# Patient Record
Sex: MALE | Race: WHITE | NOT HISPANIC OR LATINO | Employment: FULL TIME | URBAN - METROPOLITAN AREA
[De-identification: names, ages, dates, MRNs, and addresses within clinical notes are randomized per-mention and may not be internally consistent; named-entity substitution may affect disease eponyms.]

---

## 2017-03-26 ENCOUNTER — TRANSCRIBE ORDERS (OUTPATIENT)
Dept: ADMINISTRATIVE | Age: 32
End: 2017-03-26

## 2017-03-26 ENCOUNTER — APPOINTMENT (OUTPATIENT)
Dept: LAB | Age: 32
End: 2017-03-26
Payer: COMMERCIAL

## 2017-03-26 DIAGNOSIS — I10 ESSENTIAL (PRIMARY) HYPERTENSION: ICD-10-CM

## 2017-03-26 DIAGNOSIS — F42.9 OBSESSIVE-COMPULSIVE DISORDER: ICD-10-CM

## 2017-03-26 DIAGNOSIS — E66.9 OBESITY: ICD-10-CM

## 2017-03-26 LAB
ANION GAP SERPL CALCULATED.3IONS-SCNC: 7 MMOL/L (ref 4–13)
BUN SERPL-MCNC: 16 MG/DL (ref 5–25)
CALCIUM SERPL-MCNC: 8.9 MG/DL (ref 8.3–10.1)
CHLORIDE SERPL-SCNC: 104 MMOL/L (ref 100–108)
CO2 SERPL-SCNC: 28 MMOL/L (ref 21–32)
CREAT SERPL-MCNC: 0.76 MG/DL (ref 0.6–1.3)
GFR SERPL CREATININE-BSD FRML MDRD: >60 ML/MIN/1.73SQ M
GLUCOSE P FAST SERPL-MCNC: 95 MG/DL (ref 65–99)
POTASSIUM SERPL-SCNC: 3.8 MMOL/L (ref 3.5–5.3)
SODIUM SERPL-SCNC: 139 MMOL/L (ref 136–145)
TSH SERPL DL<=0.05 MIU/L-ACNC: 2.9 UIU/ML (ref 0.36–3.74)

## 2017-03-26 PROCEDURE — 84443 ASSAY THYROID STIM HORMONE: CPT

## 2017-03-26 PROCEDURE — 36415 COLL VENOUS BLD VENIPUNCTURE: CPT

## 2017-03-26 PROCEDURE — 80048 BASIC METABOLIC PNL TOTAL CA: CPT

## 2017-03-27 ENCOUNTER — ALLSCRIPTS OFFICE VISIT (OUTPATIENT)
Dept: OTHER | Facility: OTHER | Age: 32
End: 2017-03-27

## 2017-09-05 DIAGNOSIS — I10 ESSENTIAL (PRIMARY) HYPERTENSION: ICD-10-CM

## 2017-09-05 DIAGNOSIS — E66.01 MORBID (SEVERE) OBESITY DUE TO EXCESS CALORIES (HCC): ICD-10-CM

## 2017-09-05 DIAGNOSIS — F41.9 ANXIETY DISORDER: ICD-10-CM

## 2017-09-05 DIAGNOSIS — F42.9 OBSESSIVE-COMPULSIVE DISORDER: ICD-10-CM

## 2017-10-08 ENCOUNTER — APPOINTMENT (OUTPATIENT)
Dept: LAB | Age: 32
End: 2017-10-08
Payer: COMMERCIAL

## 2017-10-08 ENCOUNTER — TRANSCRIBE ORDERS (OUTPATIENT)
Dept: ADMINISTRATIVE | Age: 32
End: 2017-10-08

## 2017-10-08 DIAGNOSIS — I10 ESSENTIAL (PRIMARY) HYPERTENSION: ICD-10-CM

## 2017-10-08 DIAGNOSIS — F41.9 ANXIETY DISORDER: ICD-10-CM

## 2017-10-08 DIAGNOSIS — F42.9 OBSESSIVE-COMPULSIVE DISORDER: ICD-10-CM

## 2017-10-08 DIAGNOSIS — E66.01 MORBID (SEVERE) OBESITY DUE TO EXCESS CALORIES (HCC): ICD-10-CM

## 2017-10-08 LAB
ALBUMIN SERPL BCP-MCNC: 3.7 G/DL (ref 3.5–5)
ALP SERPL-CCNC: 83 U/L (ref 46–116)
ALT SERPL W P-5'-P-CCNC: 49 U/L (ref 12–78)
ANION GAP SERPL CALCULATED.3IONS-SCNC: 6 MMOL/L (ref 4–13)
AST SERPL W P-5'-P-CCNC: 27 U/L (ref 5–45)
BILIRUB SERPL-MCNC: 0.69 MG/DL (ref 0.2–1)
BUN SERPL-MCNC: 17 MG/DL (ref 5–25)
CALCIUM SERPL-MCNC: 9 MG/DL (ref 8.3–10.1)
CHLORIDE SERPL-SCNC: 103 MMOL/L (ref 100–108)
CHOLEST SERPL-MCNC: 153 MG/DL (ref 50–200)
CO2 SERPL-SCNC: 30 MMOL/L (ref 21–32)
CREAT SERPL-MCNC: 0.73 MG/DL (ref 0.6–1.3)
ERYTHROCYTE [DISTWIDTH] IN BLOOD BY AUTOMATED COUNT: 13.4 % (ref 11.6–15.1)
GFR SERPL CREATININE-BSD FRML MDRD: 124 ML/MIN/1.73SQ M
GLUCOSE P FAST SERPL-MCNC: 91 MG/DL (ref 65–99)
HCT VFR BLD AUTO: 44.4 % (ref 36.5–49.3)
HDLC SERPL-MCNC: 50 MG/DL (ref 40–60)
HGB BLD-MCNC: 14.8 G/DL (ref 12–17)
LDLC SERPL CALC-MCNC: 87 MG/DL (ref 0–100)
MCH RBC QN AUTO: 29.4 PG (ref 26.8–34.3)
MCHC RBC AUTO-ENTMCNC: 33.3 G/DL (ref 31.4–37.4)
MCV RBC AUTO: 88 FL (ref 82–98)
PLATELET # BLD AUTO: 293 THOUSANDS/UL (ref 149–390)
PMV BLD AUTO: 11.6 FL (ref 8.9–12.7)
POTASSIUM SERPL-SCNC: 4.3 MMOL/L (ref 3.5–5.3)
PROT SERPL-MCNC: 7.9 G/DL (ref 6.4–8.2)
RBC # BLD AUTO: 5.04 MILLION/UL (ref 3.88–5.62)
SODIUM SERPL-SCNC: 139 MMOL/L (ref 136–145)
TRIGL SERPL-MCNC: 82 MG/DL
WBC # BLD AUTO: 10.07 THOUSAND/UL (ref 4.31–10.16)

## 2017-10-08 PROCEDURE — 80061 LIPID PANEL: CPT

## 2017-10-08 PROCEDURE — 80053 COMPREHEN METABOLIC PANEL: CPT

## 2017-10-08 PROCEDURE — 85027 COMPLETE CBC AUTOMATED: CPT

## 2017-10-08 PROCEDURE — 36415 COLL VENOUS BLD VENIPUNCTURE: CPT

## 2017-10-09 ENCOUNTER — ALLSCRIPTS OFFICE VISIT (OUTPATIENT)
Dept: OTHER | Facility: OTHER | Age: 32
End: 2017-10-09

## 2017-10-10 NOTE — PROGRESS NOTES
Assessment  1  Benign essential hypertension (401 1) (I10)   2  Obsessive compulsive disorder (300 3) (F42 9)   3  Anxiety (300 00) (F41 9)    Plan  Anxiety, Episodic mood disorder    · FLUoxetine HCl - 40 MG Oral Capsule; TAKE 1 CAPSULE DAILY  Benign essential hypertension    · Lisinopril 10 MG Oral Tablet; Take 1 tablet daily   · Begin or continue regular aerobic exercise  Gradually work up to at least {count1} sessions of  {dur1} of exercise a week ; Status:Complete;   Done: [id number]    Discussion/Summary  Discussion Summary:   Pt to avoid salt in diet, limit caffiene to 1-2cups / dayreviewed with pt during visit      Chief Complaint  Chief Complaint Free Text Note Form: Patient is here for a f/u for Hypertension, Anxiety, and obesity  Review blood work  Pt does not have any new complains at the moment  History of Present Illness  HPI: f/u for htn - tolerates lisinopril well, creatinine and electrolytes stablenot check at home has not been monitoring his salt intakedrinks 1-2 cups coffee / day is not exercising on regular basis      Review of Systems  Complete-Male:   Constitutional: no fever,-not feeling poorly,-no chills-and-not feeling tired  Eyes: no eye pain-and-no eyesight problems  ENT: no earache,-no nosebleeds,-no sore throat,-no nasal discharge-and-no hoarseness  Cardiovascular: no chest pain-and-no palpitations  Respiratory: no shortness of breath,-no cough,-no wheezing-and-no shortness of breath during exertion  Gastrointestinal: no abdominal pain,-no nausea-and-no constipation  Genitourinary: no dysuria  Musculoskeletal: no arthralgias,-no joint swelling-and-no joint stiffness  Integumentary: no rashes,-no itching-and-no skin lesions  Neurological: no headache-and-no dizziness  Psychiatric: no anxiety-and-no sleep disturbances  Endocrine: no hot flashes  Hematologic/Lymphatic: no swollen glands  ROS Reviewed:   ROS reviewed  Active Problems  1   Anxiety (300 00) (F41 9)   2  Benign essential hypertension (401 1) (I10)   3  Episodic mood disorder (296 90) (F39)   4  Morbid obesity with BMI of 45 0-49 9, adult (278 01,V85 42) (E66 01,Z68 42)   5  Obesity, unspecified obesity severity, unspecified obesity type (278 00) (E66 9)   6  Obsessive compulsive disorder (300 3) (F42 9)    Past Medical History  1  History of Abnormal glucose (790 29) (R73 09)   2  History of Eustachian tube disorder (381 9) (H69 90)   3  History of Plantar fascial fibromatosis (728 71) (M72 2)  Active Problems And Past Medical History Reviewed: The active problems and past medical history were reviewed and updated today  Surgical History  1  History of Appendectomy   2  History of Bone Replacement Graft  Surgical History Reviewed: The surgical history was reviewed and updated today  Family History  Mother    1  No pertinent family history  Father    2  No pertinent family history  Family History Reviewed: The family history was reviewed and updated today  Social History   · Never a smoker   · No illicit drug use   · Social alcohol use (Z78 9)  Social History Reviewed: The social history was reviewed and updated today  Current Meds   1  FLUoxetine HCl - 40 MG Oral Capsule; TAKE 1 CAPSULE DAILY  Requested for: 89MIG1577; Last   Rx:29Jun2017; Status: ACTIVE - Renewal Denied Ordered   2  Lisinopril 10 MG Oral Tablet; Take 1 tablet daily  Requested for: 97CHK9846; Last Rx:29Jun2017;   Status: ACTIVE - Renewal Denied Ordered  Medication List Reviewed: The medication list was reviewed and updated today  Allergies  1  No Known Drug Allergies  2  No Known Environmental Allergies   3   No Known Food Allergies    Vitals  Vital Signs    Recorded: 24CRA0007 08:55AM   Temperature 98 4 F, Oral   Heart Rate 85   Systolic 028, LUE, Sitting   Diastolic 78, LUE, Sitting   BP CUFF SIZE Large   Height 6 ft 1 in   Weight 354 lb 6 oz   BMI Calculated 46 75   BSA Calculated 2 74   O2 Saturation 98     Physical Exam    Constitutional   General appearance: No acute distress, well appearing and well nourished  Eyes   Conjunctiva and lids: No swelling, erythema, or discharge  Pupils and irises: Equal, round and reactive to light  Ears, Nose, Mouth, and Throat   External inspection of ears and nose: Normal     Otoscopic examination: Tympanic membrance translucent with normal light reflex  Canals patent without erythema  Nasal mucosa, septum, and turbinates: Normal without edema or erythema  Oropharynx: Normal with no erythema, edema, exudate or lesions  Pulmonary   Auscultation of lungs: Clear to auscultation, equal breath sounds bilaterally, no wheezes, no rales, no rhonci  Cardiovascular   Auscultation of heart: Normal rate and rhythm, normal S1 and S2, without murmurs  Examination of extremities for edema and/or varicosities: Normal     Abdomen   Abdomen: Non-tender, no masses  Lymphatic   Palpation of lymph nodes in neck: No lymphadenopathy  Musculoskeletal   Gait and station: Normal     Inspection/palpation of joints, bones, and muscles: Normal     Skin   Skin and subcutaneous tissue: Normal without rashes or lesions  Neurologic   Cranial nerves: Cranial nerves 2-12 intact      Psychiatric   Orientation to person, place and time: Normal     Mood and affect: Normal          Results/Data  (1) CBC/ PLT (NO DIFF) [id number] 08:24AM [facility] Order Number: [id number]     Test Name Result Flag Reference   HEMATOCRIT 44 4 %  36 5-49 3   HEMOGLOBIN 14 8 g/dL  12 0-17 0   MCHC 33 3 g/dL  31 4-37 4   MCH 29 4 pg  26 8-34 3   MCV 88 fL  82-98   PLATELET COUNT 146 Thousands/uL  149-390   RBC COUNT 5 04 Million/uL  3 88-5 62   RDW 13 4 %  11 6-15 1   WBC COUNT 10 07 Thousand/uL  4 31-10 16   MPV 11 6 fL  8 9-12 7     (1) COMPREHENSIVE METABOLIC PANEL [id number] 27:59JX [facility] Order Number: [id number]     Test Name Result Flag Reference   SODIUM 139 mmol/L  136-145   POTASSIUM 4 3 mmol/L  3 5-5 3   CHLORIDE 103 mmol/L  100-108   CARBON DIOXIDE 30 mmol/L  21-32   ANION GAP (CALC) 6 mmol/L  4-13   BLOOD UREA NITROGEN 17 mg/dL  5-25   CREATININE 0 73 mg/dL  0 60-1 30   Standardized to [facility] reference method   CALCIUM 9 0 mg/dL  8 3-10 1   BILI, TOTAL 0 69 mg/dL  0 20-1 00   ALK PHOSPHATAS 83 U/L     ALT (SGPT) 49 U/L  12-78   Specimen collection should occur prior to Sulfasalazine and/or Sulfapyridine administration due to the potential for falsely depressed results  AST(SGOT) 27 U/L  5-45   Specimen collection should occur prior to Sulfasalazine administration due to the potential for falsely depressed results  ALBUMIN 3 7 g/dL  3 5-5 0   TOTAL PROTEIN 7 9 g/dL  6 4-8 2   eGFR 124 ml/min/1 73sq m     [facility] Disease Education Program recommendations are as follows:  GFR calculation is accurate only with a steady state creatinine  Chronic Kidney disease less than 60 ml/min/1 73 sq  meters  Kidney failure less than 15 ml/min/1 73 sq  meters  GLUCOSE FASTING 91 mg/dL  65-99   Specimen collection should occur prior to Sulfasalazine administration due to the potential for falsely depressed results  Specimen collection should occur prior to Sulfapyridine administration due to the potential for falsely elevated results       (1) CBC/ PLT (NO DIFF) 08Oct2017 08:24AM [facility] Order Number: [id number]     Test Name Result Flag Reference   HEMATOCRIT 44 4 %  36 5-49 3   HEMOGLOBIN 14 8 g/dL  12 0-17 0   MCHC 33 3 g/dL  31 4-37 4   MCH 29 4 pg  26 8-34 3   MCV 88 fL  82-98   PLATELET COUNT 938 Thousands/uL  149-390   RBC COUNT 5 04 Million/uL  3 88-5 62   RDW 13 4 %  11 6-15 1   WBC COUNT 10 07 Thousand/uL  4 31-10 16   MPV 11 6 fL  8 9-12 7     (1) COMPREHENSIVE METABOLIC PANEL [id number] 60:25LM [facility] Order Number: [id number]     Test Name Result Flag Reference   SODIUM 139 mmol/L 136-145   POTASSIUM 4 3 mmol/L  3 5-5 3   CHLORIDE 103 mmol/L  100-108   CARBON DIOXIDE 30 mmol/L  21-32   ANION GAP (CALC) 6 mmol/L  4-13   BLOOD UREA NITROGEN 17 mg/dL  5-25   CREATININE 0 73 mg/dL  0 60-1 30   Standardized to IDMS reference method   CALCIUM 9 0 mg/dL  8 3-10 1   BILI, TOTAL 0 69 mg/dL  0 20-1 00   ALK PHOSPHATAS 83 U/L     ALT (SGPT) 49 U/L  12-78   Specimen collection should occur prior to Sulfasalazine and/or Sulfapyridine administration due to the potential for falsely depressed results  AST(SGOT) 27 U/L  5-45   Specimen collection should occur prior to Sulfasalazine administration due to the potential for falsely depressed results  ALBUMIN 3 7 g/dL  3 5-5 0   TOTAL PROTEIN 7 9 g/dL  6 4-8 2   eGFR 124 ml/min/1 73sq [facility] Disease Education Program recommendations are as follows:  GFR calculation is accurate only with a steady state creatinine  Chronic Kidney disease less than 60 ml/min/1 73 sq  meters  Kidney failure less than 15 ml/min/1 73 sq  meters  GLUCOSE FASTING 91 mg/dL  65-99   Specimen collection should occur prior to Sulfasalazine administration due to the potential for falsely depressed results  Specimen collection should occur prior to Sulfapyridine administration due to the potential for falsely elevated results  (1) LIPID PANEL, FASTING [id number] 08:24AM [facility] Number: [id number]     Test Name Result Flag Reference   CHOLESTEROL 153 mg/dL     HDL,DIRECT 50 mg/dL  40-60   Specimen collection should occur prior to Metamizole administration due to the potential for falsley depressed results  LDL CHOLESTEROL CALCULATED 87 mg/dL  0-100   - Patient Instructions:  This is a fasting blood test  Water,black tea or black  coffee only after 9:00pm the night before test   Drink 2 glasses of water the morning of test       Triglyceride:        Normal <150 mg/dl   Borderline High 150-199 mg/dl   High 200-499 mg/dl   Very High >499 mg/dl      Cholesterol:       Desirable <200 mg/dl    Borderline High 200-239 mg/dl    High >239 mg/dl      HDL Cholesterol:       High>59 mg/dL    Low <41 mg/dL      This screening LDL is a calculated result  It does not have the accuracy of the Direct Measured LDL in the monitoring of patients with hyperlipidemia and/or statin therapy  Direct Measure LDL (OLV892) must be ordered separately in these patients  TRIGLYCERIDES 82 mg/dL  <=150   Specimen collection should occur prior to N-Acetylcysteine or Metamizole administration due to the potential for falsely depressed results  Verified Results  (1) LIPID PANEL, FASTING [id number] 08:24AM Cristin [facility]into Order Number: [id number]     Test Name Result Flag Reference   CHOLESTEROL 153 mg/dL     HDL,DIRECT 50 mg/dL  40-60   Specimen collection should occur prior to Metamizole administration due to the potential for falsley depressed results  LDL CHOLESTEROL CALCULATED 87 mg/dL  0-100   - Patient Instructions: This is a fasting blood test  Water,black tea or black  coffee only after 9:00pm the night before test   Drink 2 glasses of water the morning of test       Triglyceride:        Normal <150 mg/dl   Borderline High 150-199 mg/dl   High 200-499 mg/dl   Very High >499 mg/dl      Cholesterol:       Desirable <200 mg/dl    Borderline High 200-239 mg/dl    High >239 mg/dl      HDL Cholesterol:       High>59 mg/dL    Low <41 mg/dL      This screening LDL is a calculated result  It does not have the accuracy of the Direct Measured LDL in the monitoring of patients with hyperlipidemia and/or statin therapy  Direct Measure LDL (TIT752) must be ordered separately in these patients  TRIGLYCERIDES 82 mg/dL  <=150   Specimen collection should occur prior to N-Acetylcysteine or Metamizole administration due to the potential for falsely depressed results         Signatures   Electronically signed by : Melissa Delacruz, [facility]; Oct  9 2017  9:18AM EST                       (Author)    Electronically signed by : Isaias Pallas, MD; Oct  9 2017  5:13PM EST

## 2018-01-13 VITALS
DIASTOLIC BLOOD PRESSURE: 86 MMHG | HEIGHT: 73 IN | TEMPERATURE: 98.5 F | SYSTOLIC BLOOD PRESSURE: 130 MMHG | WEIGHT: 315 LBS | BODY MASS INDEX: 41.75 KG/M2 | HEART RATE: 116 BPM | OXYGEN SATURATION: 98 %

## 2018-01-13 VITALS
DIASTOLIC BLOOD PRESSURE: 78 MMHG | WEIGHT: 315 LBS | HEIGHT: 73 IN | TEMPERATURE: 98.4 F | BODY MASS INDEX: 41.75 KG/M2 | HEART RATE: 85 BPM | SYSTOLIC BLOOD PRESSURE: 124 MMHG | OXYGEN SATURATION: 98 %

## 2018-01-15 NOTE — RESULT NOTES
Verified Results  (1) CBC/ PLT (NO DIFF) 08Oct2017 08:24AM [facility] Order Number: [id number]     Test Name Result Flag Reference   HEMATOCRIT 44 4 %  36 5-49 3   HEMOGLOBIN 14 8 g/dL  12 0-17 0   MCHC 33 3 g/dL  31 4-37 4   MCH 29 4 pg  26 8-34 3   MCV 88 fL  82-98   PLATELET COUNT 976 Thousands/uL  149-390   RBC COUNT 5 04 Million/uL  3 88-5 62   RDW 13 4 %  11 6-15 1   WBC COUNT 10 07 Thousand/uL  4 31-10 16   MPV 11 6 fL  8 9-12 7     (1) COMPREHENSIVE METABOLIC PANEL [id number] 28:40MX [facility] Order Number: [id number]     Test Name Result Flag Reference   SODIUM 139 mmol/L  136-145   POTASSIUM 4 3 mmol/L  3 5-5 3   CHLORIDE 103 mmol/L  100-108   CARBON DIOXIDE 30 mmol/L  21-32   ANION GAP (CALC) 6 mmol/L  4-13   BLOOD UREA NITROGEN 17 mg/dL  5-25   CREATININE 0 73 mg/dL  0 60-1 30   Standardized to IDMS reference method   CALCIUM 9 0 mg/dL  8 3-10 1   BILI, TOTAL 0 69 mg/dL  0 20-1 00   ALK PHOSPHATAS 83 U/L     ALT (SGPT) 49 U/L  12-78   Specimen collection should occur prior to Sulfasalazine and/or Sulfapyridine administration due to the potential for falsely depressed results  AST(SGOT) 27 U/L  5-45   Specimen collection should occur prior to Sulfasalazine administration due to the potential for falsely depressed results  ALBUMIN 3 7 g/dL  3 5-5 0   TOTAL PROTEIN 7 9 g/dL  6 4-8 2   eGFR 124 ml/min/1 73sq m     [facility] Disease Education Program recommendations are as follows:  GFR calculation is accurate only with a steady state creatinine  Chronic Kidney disease less than 60 ml/min/1 73 sq  meters  Kidney failure less than 15 ml/min/1 73 sq  meters  GLUCOSE FASTING 91 mg/dL  65-99   Specimen collection should occur prior to Sulfasalazine administration due to the potential for falsely depressed results  Specimen collection should occur prior to Sulfapyridine administration due to the potential for falsely elevated results  Plan  Anxiety, Episodic mood disorder    · FLUoxetine HCl - 40 MG Oral Capsule; TAKE 1 CAPSULE DAILY  Benign essential hypertension    · Lisinopril 10 MG Oral Tablet;  Take 1 tablet daily

## 2018-04-16 DIAGNOSIS — F41.9 ANXIETY: ICD-10-CM

## 2018-04-16 DIAGNOSIS — I10 ESSENTIAL HYPERTENSION, BENIGN: Primary | ICD-10-CM

## 2018-04-16 RX ORDER — LISINOPRIL 10 MG/1
1 TABLET ORAL DAILY
COMMUNITY
End: 2018-04-16 | Stop reason: SDUPTHER

## 2018-04-16 RX ORDER — FLUOXETINE HYDROCHLORIDE 40 MG/1
1 CAPSULE ORAL DAILY
COMMUNITY
End: 2018-04-16 | Stop reason: SDUPTHER

## 2018-04-16 RX ORDER — FLUOXETINE HYDROCHLORIDE 40 MG/1
40 CAPSULE ORAL DAILY
Qty: 90 CAPSULE | Refills: 0 | Status: SHIPPED | OUTPATIENT
Start: 2018-04-16 | End: 2018-07-12 | Stop reason: SDUPTHER

## 2018-04-16 RX ORDER — LISINOPRIL 10 MG/1
10 TABLET ORAL DAILY
Qty: 90 TABLET | Refills: 0 | Status: SHIPPED | OUTPATIENT
Start: 2018-04-16 | End: 2018-07-12 | Stop reason: SDUPTHER

## 2018-07-12 DIAGNOSIS — I10 ESSENTIAL HYPERTENSION, BENIGN: ICD-10-CM

## 2018-07-12 DIAGNOSIS — F41.9 ANXIETY: ICD-10-CM

## 2018-07-12 RX ORDER — FLUOXETINE HYDROCHLORIDE 40 MG/1
40 CAPSULE ORAL DAILY
Qty: 90 CAPSULE | Refills: 0 | Status: SHIPPED | OUTPATIENT
Start: 2018-07-12 | End: 2018-10-22 | Stop reason: SDUPTHER

## 2018-07-12 RX ORDER — LISINOPRIL 10 MG/1
10 TABLET ORAL DAILY
Qty: 90 TABLET | Refills: 0 | Status: SHIPPED | OUTPATIENT
Start: 2018-07-12 | End: 2018-10-22 | Stop reason: SDUPTHER

## 2018-07-12 NOTE — TELEPHONE ENCOUNTER
Last appt, 10/9/17    Next appt, none pending     Was told he needed appt for further refills  Pt did not schedule f/u  Will hav him called for appt

## 2018-07-13 NOTE — TELEPHONE ENCOUNTER
Meds filled for 90 days with no refills  Please call pt and have him schedule f/u appt for additional refills  Thank you!

## 2018-07-13 NOTE — TELEPHONE ENCOUNTER
lmom for this patient to call the office to make an appt within the next 90 days for medication refills

## 2018-07-14 DIAGNOSIS — F41.9 ANXIETY: ICD-10-CM

## 2018-07-14 DIAGNOSIS — I10 ESSENTIAL HYPERTENSION, BENIGN: ICD-10-CM

## 2018-07-14 RX ORDER — FLUOXETINE HYDROCHLORIDE 40 MG/1
40 CAPSULE ORAL DAILY
Qty: 90 CAPSULE | Refills: 0 | OUTPATIENT
Start: 2018-07-14

## 2018-07-14 RX ORDER — LISINOPRIL 10 MG/1
10 TABLET ORAL DAILY
Qty: 90 TABLET | Refills: 0 | OUTPATIENT
Start: 2018-07-14

## 2018-10-22 ENCOUNTER — OFFICE VISIT (OUTPATIENT)
Dept: INTERNAL MEDICINE CLINIC | Facility: CLINIC | Age: 33
End: 2018-10-22

## 2018-10-22 VITALS
HEIGHT: 75 IN | OXYGEN SATURATION: 98 % | SYSTOLIC BLOOD PRESSURE: 118 MMHG | DIASTOLIC BLOOD PRESSURE: 80 MMHG | TEMPERATURE: 97.3 F | HEART RATE: 73 BPM | WEIGHT: 315 LBS | BODY MASS INDEX: 39.17 KG/M2

## 2018-10-22 DIAGNOSIS — F42.9 OBSESSIVE-COMPULSIVE DISORDER, UNSPECIFIED TYPE: ICD-10-CM

## 2018-10-22 DIAGNOSIS — F41.9 ANXIETY: ICD-10-CM

## 2018-10-22 DIAGNOSIS — I10 BENIGN ESSENTIAL HTN: Primary | ICD-10-CM

## 2018-10-22 DIAGNOSIS — I10 ESSENTIAL HYPERTENSION, BENIGN: ICD-10-CM

## 2018-10-22 DIAGNOSIS — G47.30 OBSERVED SLEEP APNEA: ICD-10-CM

## 2018-10-22 DIAGNOSIS — E66.01 MORBID OBESITY WITH BMI OF 45.0-49.9, ADULT (HCC): ICD-10-CM

## 2018-10-22 PROCEDURE — 99213 OFFICE O/P EST LOW 20 MIN: CPT | Performed by: NURSE PRACTITIONER

## 2018-10-22 RX ORDER — LISINOPRIL 10 MG/1
10 TABLET ORAL DAILY
Qty: 90 TABLET | Refills: 0 | Status: SHIPPED | OUTPATIENT
Start: 2018-10-22 | End: 2019-01-21 | Stop reason: SDUPTHER

## 2018-10-22 RX ORDER — FLUOXETINE HYDROCHLORIDE 40 MG/1
40 CAPSULE ORAL DAILY
Qty: 90 CAPSULE | Refills: 0 | Status: SHIPPED | OUTPATIENT
Start: 2018-10-22 | End: 2019-01-21 | Stop reason: SDUPTHER

## 2018-10-22 NOTE — PATIENT INSTRUCTIONS
Follow up in 3 months   Start healthy diet and start an exercise routine with 30 minutes of moderate intensity exercise 5x a week  Look into getting insurance

## 2018-10-22 NOTE — PROGRESS NOTES
Assessment/Plan:     Diagnoses and all orders for this visit:    Anxiety  -     FLUoxetine (PROzac) 40 MG capsule; Take 1 capsule (40 mg total) by mouth daily        -     well controlled continue Prozac 40 mg daily    Essential hypertension, benign  -     lisinopril (ZESTRIL) 10 mg tablet; Take 1 tablet (10 mg total) by mouth daily        -      well controlled  Continue lisinopril 10 mg daily  Morbid obesity with BMI of 45 0-49 9, adult ([facility] [address] )         -     discussed at length with patient the need for improvement of his weight  I discussed with him following up with weight management and trying conservative therapies with diet and exercise or meeting with bariatric surgery to discuss weight loss surgery  He is not interested at this time as he does not have health insurance  I discussed with him that there is a significant amount of improvement that he can make on his own within his own lifestyle  I advised him to start an exercise routine where he is doing at least 30 minutes of moderately intense exercise 5 times a week  I also reviewed healthy diet with him that would be higher in fruits and vegetables, lean protein, low-fat dairy  He needs to cut out fast food and cut back on carbohydrates as well as fried greasy and fatty foods  I will have him follow up with our office in about 3 months to follow his weight  I strongly recommended to him that he look for healthcare insurance so that he would be able to have surgery covered  I explained to to him that with weight loss he may be able to come off of his blood pressure medication and this would also help with his possible sleep apnea  I explained to him how obesity affects his health risks       Obsessive-compulsive disorder, unspecified type          -     well controlled continue Prozac 40 mg daily    Observed sleep apnea           -    declined sleep study at this time due to lack of insurance    Subjective:      Patient ID: Juan Narciso is a 34 y o  male  HPI    Patient presents today for follow up chronic conditions  He was last seen guillermo 1 year ago  Anxiety and OCD  Patient is here for follow up of anxiety  Current symptoms include: compulsion to continue recheck his work, anxiousness  Symptoms are only if he misses his medication which happens about once a month, otherwise with his medication he is symptom free  He denies current suicidal and homicidal ideation  Current treatment includes Prozac 40mg for about the last 7 years  He complains of the following medication side effects: none  Hypertension  Patient is here for follow-up of elevated blood pressure  He is not exercising and is not adherent to a low-salt diet  He does notchecks his blood pressure at home  Blood pressure is well controlled  Current cardiac symptoms: none  Patient denies chest pain, chest pressure/discomfort, dyspnea, exertional chest pressure/discomfort, fatigue, irregular heart beat, lower extremity edema and palpitations  Cardiovascular risk factors: hypertension, male gender, obesity (BMI >= 30 kg/m2) and sedentary lifestyle  History of target organ damage: none  He is currently taking lisinopril 10mg daily  He is compliant with medication use  Morbid Obesity  Patient has gained about 19 lbs in the last year  He states at home he eats fairly healthy  When he is at work or stressed he does eat fast food  He works at [employer]  He is not exercising  He does not currently have health insurance  He states he is going to try to lose weight on his own through a better diet and exercise  Observed sleep apnea  Patient reports that he snores often  He states that his wife has told him that he stops breathing during sleep  He denies excessive daytime sleepiness  He is unsure if he well rested after sleeping as they have a 1 yr old son at home who often gets up during the night   He has never had a sleep study performed in the past      The following portions of the patient's history were reviewed and updated as appropriate: allergies, current medications, past family history, past medical history, past social history, past surgical history and problem list     Review of Systems   Constitutional: Negative for chills and fever  Respiratory: Negative for cough, chest tightness, shortness of breath and wheezing  Cardiovascular: Negative for chest pain, palpitations and leg swelling  Neurological: Negative for headaches  Psychiatric/Behavioral: Negative for dysphoric mood, self-injury, sleep disturbance and suicidal ideas  The patient is not nervous/anxious (controlled)  Past Medical History:   Diagnosis Date    Eustachian tube disorder     Fibromatosis, plantar          Current Outpatient Prescriptions:     FLUoxetine (PROzac) 40 MG capsule, Take 1 capsule (40 mg total) by mouth daily, Disp: 90 capsule, Rfl: 0    lisinopril (ZESTRIL) 10 mg tablet, Take 1 tablet (10 mg total) by mouth daily, Disp: 90 tablet, Rfl: 0    No Known Allergies    Social History   Past Surgical History:   Procedure Laterality Date    APPENDECTOMY      BONE GRAFT      Bone replacement graft     Family History   Problem Relation Age of Onset    No Known Problems Mother     No Known Problems Father        Objective:  /80 (BP Location: Left arm, Patient Position: Sitting, Cuff Size: Large)   Pulse 73   Temp (!) 97 3 °F (36 3 °C) (Tympanic)   Ht 6' 2 5" (1 892 m)   Wt (!) 169 kg (373 lb 3 2 oz)   SpO2 98%   BMI 47 28 kg/m²     Physical Exam   Constitutional: He is oriented to person, place, and time  He appears well-developed and well-nourished  No distress  Morbidly obese   HENT:   Head: Normocephalic and atraumatic     Right Ear: Tympanic membrane and external ear normal    Left Ear: Tympanic membrane and external ear normal    Nose: Nose normal    Mouth/Throat: Oropharynx is clear and moist  No oropharyngeal exudate, posterior oropharyngeal edema or posterior oropharyngeal erythema  Eyes: Pupils are equal, round, and reactive to light  Conjunctivae and EOM are normal    Neck: Normal range of motion  Neck supple  No thyromegaly present  Neck circumference 18 75 inches   Cardiovascular: Normal rate, regular rhythm and normal heart sounds  No murmur heard  Pulmonary/Chest: Effort normal and breath sounds normal  No respiratory distress  He has no decreased breath sounds  He has no wheezes  He has no rhonchi  Musculoskeletal: He exhibits no edema  Lymphadenopathy:     He has no cervical adenopathy  Neurological: He is alert and oriented to person, place, and time  Skin: Skin is warm and dry  He is not diaphoretic  Psychiatric: He has a normal mood and affect  His behavior is normal    Vitals reviewed

## 2019-01-21 DIAGNOSIS — F41.9 ANXIETY: ICD-10-CM

## 2019-01-21 DIAGNOSIS — I10 ESSENTIAL HYPERTENSION, BENIGN: ICD-10-CM

## 2019-01-21 RX ORDER — FLUOXETINE HYDROCHLORIDE 40 MG/1
40 CAPSULE ORAL DAILY
Qty: 90 CAPSULE | Refills: 1 | Status: SHIPPED | OUTPATIENT
Start: 2019-01-21 | End: 2019-08-14 | Stop reason: SDUPTHER

## 2019-01-21 RX ORDER — LISINOPRIL 10 MG/1
10 TABLET ORAL DAILY
Qty: 90 TABLET | Refills: 1 | Status: SHIPPED | OUTPATIENT
Start: 2019-01-21 | End: 2019-08-14 | Stop reason: SDUPTHER

## 2019-08-13 DIAGNOSIS — F41.9 ANXIETY: ICD-10-CM

## 2019-08-13 DIAGNOSIS — I10 ESSENTIAL HYPERTENSION, BENIGN: ICD-10-CM

## 2019-08-14 DIAGNOSIS — F41.9 ANXIETY: ICD-10-CM

## 2019-08-14 DIAGNOSIS — I10 ESSENTIAL HYPERTENSION, BENIGN: ICD-10-CM

## 2019-08-14 RX ORDER — LISINOPRIL 10 MG/1
TABLET ORAL
Qty: 90 TABLET | Refills: 1 | OUTPATIENT
Start: 2019-08-14

## 2019-08-14 RX ORDER — FLUOXETINE HYDROCHLORIDE 40 MG/1
CAPSULE ORAL
Qty: 90 CAPSULE | Refills: 1 | OUTPATIENT
Start: 2019-08-14

## 2019-08-14 RX ORDER — FLUOXETINE HYDROCHLORIDE 40 MG/1
40 CAPSULE ORAL DAILY
Qty: 30 CAPSULE | Refills: 0 | Status: SHIPPED | OUTPATIENT
Start: 2019-08-14 | End: 2019-09-30 | Stop reason: SDUPTHER

## 2019-08-14 RX ORDER — LISINOPRIL 10 MG/1
10 TABLET ORAL DAILY
Qty: 30 TABLET | Refills: 0 | Status: SHIPPED | OUTPATIENT
Start: 2019-08-14 | End: 2019-09-30 | Stop reason: SDUPTHER

## 2019-08-14 NOTE — TELEPHONE ENCOUNTER
Last ov  10/22/18  Next ov  9/23/19    Told patient to come in sooner because we can only give him a 30 day supply  He said he will have enough medication to last until 9/23/19

## 2019-09-06 DIAGNOSIS — I10 ESSENTIAL HYPERTENSION, BENIGN: ICD-10-CM

## 2019-09-06 DIAGNOSIS — F41.9 ANXIETY: ICD-10-CM

## 2019-09-06 RX ORDER — FLUOXETINE HYDROCHLORIDE 40 MG/1
CAPSULE ORAL
Qty: 30 CAPSULE | Refills: 0 | OUTPATIENT
Start: 2019-09-06

## 2019-09-06 RX ORDER — LISINOPRIL 10 MG/1
TABLET ORAL
Qty: 30 TABLET | Refills: 0 | OUTPATIENT
Start: 2019-09-06

## 2019-09-30 ENCOUNTER — APPOINTMENT (OUTPATIENT)
Dept: LAB | Age: 34
End: 2019-09-30
Payer: COMMERCIAL

## 2019-09-30 ENCOUNTER — OFFICE VISIT (OUTPATIENT)
Dept: INTERNAL MEDICINE CLINIC | Facility: CLINIC | Age: 34
End: 2019-09-30
Payer: COMMERCIAL

## 2019-09-30 VITALS
TEMPERATURE: 98.6 F | DIASTOLIC BLOOD PRESSURE: 74 MMHG | OXYGEN SATURATION: 98 % | HEIGHT: 75 IN | BODY MASS INDEX: 39.17 KG/M2 | SYSTOLIC BLOOD PRESSURE: 110 MMHG | WEIGHT: 315 LBS | HEART RATE: 88 BPM

## 2019-09-30 DIAGNOSIS — G47.30 OBSERVED SLEEP APNEA: Primary | ICD-10-CM

## 2019-09-30 DIAGNOSIS — I10 BENIGN ESSENTIAL HTN: ICD-10-CM

## 2019-09-30 DIAGNOSIS — E66.01 MORBID OBESITY WITH BMI OF 45.0-49.9, ADULT (HCC): ICD-10-CM

## 2019-09-30 DIAGNOSIS — F41.9 ANXIETY: ICD-10-CM

## 2019-09-30 DIAGNOSIS — I10 ESSENTIAL HYPERTENSION, BENIGN: ICD-10-CM

## 2019-09-30 LAB
ALBUMIN SERPL BCP-MCNC: 4 G/DL (ref 3.5–5)
ALP SERPL-CCNC: 101 U/L (ref 46–116)
ALT SERPL W P-5'-P-CCNC: 38 U/L (ref 12–78)
ANION GAP SERPL CALCULATED.3IONS-SCNC: 2 MMOL/L (ref 4–13)
AST SERPL W P-5'-P-CCNC: 19 U/L (ref 5–45)
BASOPHILS # BLD AUTO: 0.09 THOUSANDS/ΜL (ref 0–0.1)
BASOPHILS NFR BLD AUTO: 1 % (ref 0–1)
BILIRUB SERPL-MCNC: 0.41 MG/DL (ref 0.2–1)
BUN SERPL-MCNC: 16 MG/DL (ref 5–25)
CALCIUM SERPL-MCNC: 9.6 MG/DL (ref 8.3–10.1)
CHLORIDE SERPL-SCNC: 106 MMOL/L (ref 100–108)
CHOLEST SERPL-MCNC: 169 MG/DL (ref 50–200)
CO2 SERPL-SCNC: 31 MMOL/L (ref 21–32)
CREAT SERPL-MCNC: 0.78 MG/DL (ref 0.6–1.3)
EOSINOPHIL # BLD AUTO: 0.15 THOUSAND/ΜL (ref 0–0.61)
EOSINOPHIL NFR BLD AUTO: 2 % (ref 0–6)
ERYTHROCYTE [DISTWIDTH] IN BLOOD BY AUTOMATED COUNT: 12.7 % (ref 11.6–15.1)
GFR SERPL CREATININE-BSD FRML MDRD: 119 ML/MIN/1.73SQ M
GLUCOSE P FAST SERPL-MCNC: 96 MG/DL (ref 65–99)
HCT VFR BLD AUTO: 47.4 % (ref 36.5–49.3)
HDLC SERPL-MCNC: 45 MG/DL (ref 40–60)
HGB BLD-MCNC: 14.9 G/DL (ref 12–17)
IMM GRANULOCYTES # BLD AUTO: 0.08 THOUSAND/UL (ref 0–0.2)
IMM GRANULOCYTES NFR BLD AUTO: 1 % (ref 0–2)
LDLC SERPL CALC-MCNC: 112 MG/DL (ref 0–100)
LYMPHOCYTES # BLD AUTO: 2.48 THOUSANDS/ΜL (ref 0.6–4.47)
LYMPHOCYTES NFR BLD AUTO: 25 % (ref 14–44)
MCH RBC QN AUTO: 28.5 PG (ref 26.8–34.3)
MCHC RBC AUTO-ENTMCNC: 31.4 G/DL (ref 31.4–37.4)
MCV RBC AUTO: 91 FL (ref 82–98)
MONOCYTES # BLD AUTO: 0.89 THOUSAND/ΜL (ref 0.17–1.22)
MONOCYTES NFR BLD AUTO: 9 % (ref 4–12)
NEUTROPHILS # BLD AUTO: 6.2 THOUSANDS/ΜL (ref 1.85–7.62)
NEUTS SEG NFR BLD AUTO: 62 % (ref 43–75)
NONHDLC SERPL-MCNC: 124 MG/DL
NRBC BLD AUTO-RTO: 0 /100 WBCS
PLATELET # BLD AUTO: 280 THOUSANDS/UL (ref 149–390)
PMV BLD AUTO: 11.3 FL (ref 8.9–12.7)
POTASSIUM SERPL-SCNC: 4.3 MMOL/L (ref 3.5–5.3)
PROT SERPL-MCNC: 8 G/DL (ref 6.4–8.2)
RBC # BLD AUTO: 5.23 MILLION/UL (ref 3.88–5.62)
SODIUM SERPL-SCNC: 139 MMOL/L (ref 136–145)
TRIGL SERPL-MCNC: 61 MG/DL
TSH SERPL DL<=0.05 MIU/L-ACNC: 2.02 UIU/ML (ref 0.36–3.74)
WBC # BLD AUTO: 9.89 THOUSAND/UL (ref 4.31–10.16)

## 2019-09-30 PROCEDURE — 99395 PREV VISIT EST AGE 18-39: CPT | Performed by: NURSE PRACTITIONER

## 2019-09-30 PROCEDURE — 80061 LIPID PANEL: CPT

## 2019-09-30 PROCEDURE — 3078F DIAST BP <80 MM HG: CPT | Performed by: NURSE PRACTITIONER

## 2019-09-30 PROCEDURE — 80053 COMPREHEN METABOLIC PANEL: CPT

## 2019-09-30 PROCEDURE — 85025 COMPLETE CBC W/AUTO DIFF WBC: CPT

## 2019-09-30 PROCEDURE — 3074F SYST BP LT 130 MM HG: CPT | Performed by: NURSE PRACTITIONER

## 2019-09-30 PROCEDURE — 3008F BODY MASS INDEX DOCD: CPT | Performed by: NURSE PRACTITIONER

## 2019-09-30 PROCEDURE — 84443 ASSAY THYROID STIM HORMONE: CPT

## 2019-09-30 PROCEDURE — 99214 OFFICE O/P EST MOD 30 MIN: CPT | Performed by: NURSE PRACTITIONER

## 2019-09-30 PROCEDURE — 36415 COLL VENOUS BLD VENIPUNCTURE: CPT

## 2019-09-30 RX ORDER — FLUOXETINE HYDROCHLORIDE 40 MG/1
40 CAPSULE ORAL DAILY
Qty: 90 CAPSULE | Refills: 1 | Status: SHIPPED | OUTPATIENT
Start: 2019-09-30 | End: 2020-03-23 | Stop reason: SDUPTHER

## 2019-09-30 RX ORDER — LISINOPRIL 10 MG/1
10 TABLET ORAL DAILY
Qty: 90 TABLET | Refills: 1 | Status: SHIPPED | OUTPATIENT
Start: 2019-09-30 | End: 2020-03-23 | Stop reason: SDUPTHER

## 2019-09-30 NOTE — PROGRESS NOTES
Assessment/Plan:    Patient presents for overdue follow-up  No new labs to review  See changes below    Patient to update blood work to day  I will call him with results  Patient to follow up in six months, sooner if needed  Problem List Items Addressed This Visit        Respiratory    Observed sleep apnea - Primary     - (+) daytime fatigue, snoring, witnessed apnea  Risk factors: Morbid obesity, large neck circumference, HTN  - will check home sleep study         Relevant Orders    Home Study       Cardiovascular and Mediastinum    Benign essential HTN     - controlled  - continue lisinopril 10mg daily  - continue with dietary changes and weight loss  - BMI Counseling: Body mass index is 44 38 kg/m²  The BMI is above normal  Nutrition recommendations include reducing portion sizes, decreasing overall calorie intake, 3-5 servings of fruits/vegetables daily, moderation in carbohydrate intake and increasing intake of lean protein  Exercise recommendations include exercising 3-5 times per week and joining a gym  Declined weight management referral for surgical or non-surgical intervention           Relevant Medications    lisinopril (ZESTRIL) 10 mg tablet    Other Relevant Orders    CBC and differential    Comprehensive metabolic panel    Lipid panel    Home Study       Other    Anxiety     - controlled  - continue prozac 40mg daily         Relevant Medications    FLUoxetine (PROzac) 40 MG capsule    Other Relevant Orders    TSH, 3rd generation with Free T4 reflex    Morbid obesity with BMI of 45 0-49 9, [facility] ([facility] )      Other Visit Diagnoses     Essential hypertension, benign        Relevant Medications    lisinopril (ZESTRIL) 10 mg tablet        M*Modal software was used to dictate this note  It may contain errors with dictating incorrect words or incorrect spelling  Please contact the provider directly with any questions  Subjective:      Patient ID: Joey Castaneda is a 30 y o  male      Pt presents for overdue follow-up  Last seen 10/2018  No new labs to review    Additionally patient needs a form completed for foster/adoption    Hypertension:    Pt is here to follow-up on hypertension  Co-morbidities include morbid obesity  Associated symptoms include none  Denies blurred vision, Watt, CP/SOB, LE edema  Pt is currently taking lisinopril and tolerating well with no side effects  Pt does not check checks BP at home  Pt does not exercise on routine basis  Diet includes patient reports he has trying to make changes to his diet  He is incorporating more salads and grilled chicken  He does frequent a starch for dinner  Last eye exam two years ago  Last labs 2017    Anxiety   Presents for follow-up visit  Patient reports no chest pain, decreased concentration, depressed mood, dizziness, excessive worry, insomnia, irritability, nausea, nervous/anxious behavior, palpitations, panic, restlessness, shortness of breath or suicidal ideas  Symptoms occur rarely  There is a questionable history of sleep apnea  Noted on his problem list is witnessed sleep apnea  No sleep study noted on file  Patient does not use CPAP reports he ever had a sleep study completed  He averages approximately 8 hours of sleep at night  He has no difficulties falling asleep  His wife does report that he snores and asleep   + daytime fatigue    The following portions of the patient's history were reviewed and updated as appropriate: allergies, current medications, past family history, past medical history, past social history, past surgical history and problem list     Review of Systems   Constitutional: Negative for chills, fatigue, fever and irritability  HENT: Negative for congestion, postnasal drip, sinus pressure, sinus pain and sore throat  Eyes: Negative for visual disturbance  Respiratory: Negative for cough, shortness of breath and wheezing  Cardiovascular: Negative for chest pain, palpitations and leg swelling  Gastrointestinal: Negative for abdominal pain, constipation, diarrhea, nausea and vomiting  Genitourinary: Negative for dysuria, frequency, hematuria and testicular pain  Neurological: Negative for dizziness, light-headedness and headaches  Psychiatric/Behavioral: Negative for decreased concentration, dysphoric mood and suicidal ideas  The patient is not nervous/anxious and does not have insomnia  Past Medical History:   Diagnosis Date    Anxiety 8/17/2016    Benign essential hypertension 7/18/2016    Eustachian tube disorder     Fibromatosis, plantar     Morbid obesity with BMI of 45 0-49 9, adult ([facility] ) 3/27/2017         Current Outpatient Medications:     FLUoxetine (PROzac) 40 MG capsule, Take 1 capsule (40 mg total) by mouth daily, Disp: 90 capsule, Rfl: 1    lisinopril (ZESTRIL) 10 mg tablet, Take 1 tablet (10 mg total) by mouth daily, Disp: 90 tablet, Rfl: 1    No Known Allergies    Social History   Past Surgical History:   Procedure Laterality Date    APPENDECTOMY      BONE GRAFT      Bone replacement graft     Family History   Problem Relation Age of Onset    No Known Problems Mother     No Known Problems Father        Objective:  /74 (BP Location: Left arm, Patient Position: Sitting, Cuff Size: Large)   Pulse 88   Temp 98 6 °F (37 °C) (Oral)   Ht 6' 2 8" (1 9 m)   Wt (!) 160 kg (353 lb 3 2 oz)   SpO2 98%   BMI 44 38 kg/m²      Physical Exam   Constitutional: He is oriented to person, place, and time  He appears well-developed and well-nourished  No distress  Morbidly obese   HENT:   Head: Normocephalic and atraumatic  Right Ear: Hearing, tympanic membrane, external ear and ear canal normal    Left Ear: Hearing, tympanic membrane, external ear and ear canal normal    Nose: Nose normal  No mucosal edema or rhinorrhea  Mouth/Throat: Uvula is midline, oropharynx is clear and moist and mucous membranes are normal  No oropharyngeal exudate     Neck: No thyromegaly present  Large neck circumference   Cardiovascular: Normal rate, regular rhythm and normal heart sounds  No pedal edema   Pulmonary/Chest: Effort normal and breath sounds normal  No respiratory distress  He has no wheezes  Genitourinary:   Genitourinary Comments:  Declined  exam   Declines STD screening  No concerns   Neurological: He is alert and oriented to person, place, and time  No focal deficits   Skin: Skin is warm and dry  Psychiatric: He has a normal mood and affect  His speech is normal and behavior is normal  Judgment and thought content normal  His mood appears not anxious  He does not exhibit a depressed mood  Nursing note and vitals reviewed

## 2019-09-30 NOTE — PROGRESS NOTES
Assessment/Plan:    HM  - d/w pt to continue incorporating healthy lifestyle changes for weight loss  Successfully lost 20lbs in past 1 year  - declined weight management referral    - recommend update eye exam and dental    Form completed for adoption/foster     Diagnoses and all orders for this visit:    Observed sleep apnea  -     Home Study; Future    Benign essential HTN  -     CBC and differential; Future  -     Comprehensive metabolic panel; Future  -     Lipid panel; Future  -     Home Study; Future    Anxiety  -     TSH, 3rd generation with Free T4 reflex; Future  -     FLUoxetine (PROzac) 40 MG capsule; Take 1 capsule (40 mg total) by mouth daily    Essential hypertension, benign  -     lisinopril (ZESTRIL) 10 mg tablet; Take 1 tablet (10 mg total) by mouth daily    Morbid obesity with BMI of 45 0-49 9, adult (HCC)        Subjective:      Patient ID: Samuel Cortes is a 30 y o  male and presents today for Health Maintenance Physical     Last Physical: > 1 year ago    Pt reports overall health:  Overall healthy    Healthy Diet:   Patient being on making dietary changes  He is working on incorporating more vegetables, salads and lean protein  He does frequently have starches for dinner however  Routine Exercise:    No routine exercise  But does report that he is active at work  Patient works for [employer] Concerns:  Yes - morbid obesity    Problems with vision:  Yes  Wears glasses  No contacts    No concerns  Last Eye Exam:  2 years ago    Problems with Hearing:  no    Routine Dental Exams:  No   Last dental > 5 years ago    Smoking History:  no  ETOH Use:  social  Illegal Drug Use:  no    Testicular self exam:  No  Concerns for STD:  no    Last Labs:  Due for    The following portions of the patient's history were reviewed and updated as appropriate: allergies, current medications, past family history, past medical history, past social history, past surgical history and problem list     Review of Systems   Constitutional: Negative for appetite change, chills, fatigue, fever and unexpected weight change  HENT: Negative for congestion, ear pain, postnasal drip, rhinorrhea and sore throat  Eyes: Negative for visual disturbance  Respiratory: Negative for cough, chest tightness, shortness of breath and wheezing  Cardiovascular: Negative for chest pain, palpitations and leg swelling  Gastrointestinal: Negative for abdominal pain, constipation, diarrhea, nausea and vomiting  Genitourinary: Negative for discharge, dysuria, frequency, genital sores, hematuria and testicular pain  Neurological: Negative for facial asymmetry, light-headedness and headaches  Psychiatric/Behavioral: Negative for dysphoric mood and sleep disturbance  The patient is not hyperactive  Past Medical History:   Diagnosis Date    Anxiety 8/17/2016    Benign essential hypertension 7/18/2016    Eustachian tube disorder     Fibromatosis, plantar     Morbid obesity with BMI of 45 0-49 9, adult ([facility] ) 3/27/2017         Current Outpatient Medications:     FLUoxetine (PROzac) 40 MG capsule, Take 1 capsule (40 mg total) by mouth daily, Disp: 90 capsule, Rfl: 1    lisinopril (ZESTRIL) 10 mg tablet, Take 1 tablet (10 mg total) by mouth daily, Disp: 90 tablet, Rfl: 1    No Known Allergies    Social History   Past Surgical History:   Procedure Laterality Date    APPENDECTOMY      BONE GRAFT      Bone replacement graft     Family History   Problem Relation Age of Onset    No Known Problems Mother     No Known Problems Father        Objective:  /74 (BP Location: Left arm, Patient Position: Sitting, Cuff Size: Large)   Pulse 88   Temp 98 6 °F (37 °C) (Oral)   Ht 6' 2 8" (1 9 m)   Wt (!) 160 kg (353 lb 3 2 oz)   SpO2 98%   BMI 44 38 kg/m²      Physical Exam   Constitutional: He is oriented to person, place, and time  He appears well-developed and well-nourished  No distress     morbidly obese Neck: No thyromegaly present  Large neck circumference   Cardiovascular: Normal rate and regular rhythm  No pedal edema   Pulmonary/Chest: Effort normal and breath sounds normal  No respiratory distress  He has no wheezes  Genitourinary:   Genitourinary Comments: Declined  exam and STD screening   Neurological: He is alert and oriented to person, place, and time  No focal deficits   Skin: Skin is warm and dry  Psychiatric: He has a normal mood and affect  His behavior is normal  Judgment and thought content normal  His mood appears not anxious  He does not exhibit a depressed mood  He is attentive  Nursing note and vitals reviewed

## 2019-09-30 NOTE — ASSESSMENT & PLAN NOTE
- controlled  - continue lisinopril 10mg daily  - continue with dietary changes and weight loss  - BMI Counseling: Body mass index is 44 38 kg/m²  The BMI is above normal  Nutrition recommendations include reducing portion sizes, decreasing overall calorie intake, 3-5 servings of fruits/vegetables daily, moderation in carbohydrate intake and increasing intake of lean protein  Exercise recommendations include exercising 3-5 times per week and joining a gym    Declined weight management referral for surgical or non-surgical intervention

## 2019-09-30 NOTE — PATIENT INSTRUCTIONS
Continue current meds  No changes    Continue with dietary changes for weight loss  If change mind and want referral to weight management let our office know  Recommend low carb diet      Can use [employer] pal to track calories    Forms completed for foster/adoption    Get labs done - I will call you with results  Follow-up in 6 months, sooner if needed

## 2019-09-30 NOTE — ASSESSMENT & PLAN NOTE
- (+) daytime fatigue, snoring, witnessed apnea  Risk factors:   Morbid obesity, large neck circumference, HTN  - will check home sleep study

## 2020-03-23 ENCOUNTER — TELEMEDICINE (OUTPATIENT)
Dept: INTERNAL MEDICINE CLINIC | Facility: CLINIC | Age: 35
End: 2020-03-23
Payer: COMMERCIAL

## 2020-03-23 DIAGNOSIS — G47.30 OBSERVED SLEEP APNEA: Primary | ICD-10-CM

## 2020-03-23 DIAGNOSIS — I10 BENIGN ESSENTIAL HTN: ICD-10-CM

## 2020-03-23 DIAGNOSIS — F41.9 ANXIETY: ICD-10-CM

## 2020-03-23 DIAGNOSIS — I10 ESSENTIAL HYPERTENSION, BENIGN: ICD-10-CM

## 2020-03-23 PROCEDURE — 99212 OFFICE O/P EST SF 10 MIN: CPT | Performed by: NURSE PRACTITIONER

## 2020-03-23 RX ORDER — LISINOPRIL 10 MG/1
10 TABLET ORAL DAILY
Qty: 90 TABLET | Refills: 1 | Status: SHIPPED | OUTPATIENT
Start: 2020-03-23 | End: 2020-06-25 | Stop reason: SDUPTHER

## 2020-03-23 RX ORDER — FLUOXETINE HYDROCHLORIDE 40 MG/1
40 CAPSULE ORAL DAILY
Qty: 90 CAPSULE | Refills: 1 | Status: SHIPPED | OUTPATIENT
Start: 2020-03-23 | End: 2020-06-25 | Stop reason: SDUPTHER

## 2020-03-23 NOTE — PROGRESS NOTES
Virtual Regular Visit    Reason for visit is: Follow-up    Encounter provider Jovany Calixto    Provider located at [address]  [place] [address]      Recent Visits  No visits were found meeting these conditions  Showing recent visits within past 7 days and meeting all other requirements     Future Appointments  No visits were found meeting these conditions  Showing future appointments within next 150 days and meeting all other requirements        After connecting through [employer], the patient was identified by name and date of birth  Marlena Drummond was informed that this is a telemedicine visit and that the visit is being conducted through [id number] [place] [address] which may not be secure and therefore, might not be HIPAA-compliant  My office door was closed  No one else was in the room  He acknowledged consent and understanding of privacy and security of the video platform  The patient has agreed to participate and understands they can discontinue the visit at any time  Subjective  Marlena Drummond is a 30 y o  male    Patient is contacted for routine follow-up  He reports no concerns  He is not checking his blood pressure on a routine basis  He is still taking his lisinopril 10 mg daily with no side effects  Patient's anxiety is doing well  He is taking the Prozac 40 mg daily  No side effects medication  Denies any excessive worrying, panic attacks  He does have some increased anxiety relating to [place] within the community but this is to be expected  He denies any depression, feelings of worthlessness or guilt        No new blood work to be reviewed   Patient did not get his home sleep study completed - secondary to financial reasons    ROS   Negative: chest pain, shortness of breath, palpitations, dizziness, light-headedness, excessive worrying, fatigue, depression, feelings of worthlessness or guilt  Positive: anxiety    Past Medical History:   Diagnosis Date    Anxiety 8/17/2016    Benign essential hypertension 7/18/2016    Eustachian tube disorder     Fibromatosis, plantar     Morbid obesity with BMI of 45 0-49 9, adult ([facility] ) 3/27/2017       Past Surgical History:   Procedure Laterality Date    APPENDECTOMY      BONE GRAFT      Bone replacement graft       Current Outpatient Medications   Medication Sig Dispense Refill    FLUoxetine (PROzac) 40 MG capsule Take 1 capsule (40 mg total) by mouth daily 90 capsule 1    lisinopril (ZESTRIL) 10 mg tablet Take 1 tablet (10 mg total) by mouth daily 90 tablet 1     No current facility-administered medications for this visit  No Known Allergies    Exam:   no acute distress  Appears well  No respiratory distress noted on virtual exam   Speech is clear, full sentences  Alert oriented x3  Does not appear anxious or depressed    I spent 10 minutes with the patient during this visit  A/P   1  Hypertension- recommend patient checks blood pressure sporadically  He is not currently monitoring and unable to have a blood pressure during virtual visit  Continue his lisinopril 10 mg daily  He will inform our office for elevations  2  Anxiety - well controlled  Continue Prozac 40 mg daily  3   Observed sleep apnea -patient has not completed home sleep study to at this time secondary to financial reasons  He will be looking into obtaining insurance and see if he can move forward with testing  No new blood work to review     Unfortunately at the end of the virtual visit patient reported that he did not have insurance at this time  He previously did have but has lost since he was last seen    I discussed the case with management, Bubba Holland given the unexpected nature of needing to have a virtual visit completed and the limited exam I do not feel the patient should be receiving a bill    Of note a virtual visit was completed today secondary to COVID-19 within the community and concerns for potential exposure to patient

## 2020-06-25 DIAGNOSIS — I10 ESSENTIAL HYPERTENSION, BENIGN: ICD-10-CM

## 2020-06-25 DIAGNOSIS — F41.9 ANXIETY: ICD-10-CM

## 2020-06-26 RX ORDER — LISINOPRIL 10 MG/1
10 TABLET ORAL DAILY
Qty: 90 TABLET | Refills: 0 | Status: SHIPPED | OUTPATIENT
Start: 2020-06-26 | End: 2020-09-28 | Stop reason: SDUPTHER

## 2020-06-26 RX ORDER — FLUOXETINE HYDROCHLORIDE 40 MG/1
40 CAPSULE ORAL DAILY
Qty: 90 CAPSULE | Refills: 0 | Status: SHIPPED | OUTPATIENT
Start: 2020-06-26 | End: 2020-09-28 | Stop reason: SDUPTHER

## 2020-09-28 ENCOUNTER — OFFICE VISIT (OUTPATIENT)
Dept: INTERNAL MEDICINE CLINIC | Facility: CLINIC | Age: 35
End: 2020-09-28

## 2020-09-28 VITALS
OXYGEN SATURATION: 98 % | SYSTOLIC BLOOD PRESSURE: 132 MMHG | HEIGHT: 75 IN | HEART RATE: 73 BPM | BODY MASS INDEX: 39.17 KG/M2 | TEMPERATURE: 97.3 F | DIASTOLIC BLOOD PRESSURE: 90 MMHG | WEIGHT: 315 LBS

## 2020-09-28 DIAGNOSIS — I10 BENIGN ESSENTIAL HTN: ICD-10-CM

## 2020-09-28 DIAGNOSIS — F41.9 ANXIETY: ICD-10-CM

## 2020-09-28 DIAGNOSIS — E66.01 MORBID OBESITY WITH BMI OF 45.0-49.9, ADULT (HCC): ICD-10-CM

## 2020-09-28 DIAGNOSIS — I10 ESSENTIAL HYPERTENSION, BENIGN: ICD-10-CM

## 2020-09-28 DIAGNOSIS — G47.30 OBSERVED SLEEP APNEA: Primary | ICD-10-CM

## 2020-09-28 PROCEDURE — 99213 OFFICE O/P EST LOW 20 MIN: CPT | Performed by: NURSE PRACTITIONER

## 2020-09-28 RX ORDER — LISINOPRIL 20 MG/1
20 TABLET ORAL DAILY
Qty: 90 TABLET | Refills: 1 | Status: SHIPPED | OUTPATIENT
Start: 2020-09-28 | End: 2021-03-29 | Stop reason: SDUPTHER

## 2020-09-28 RX ORDER — FLUOXETINE HYDROCHLORIDE 40 MG/1
40 CAPSULE ORAL DAILY
Qty: 90 CAPSULE | Refills: 1 | Status: SHIPPED | OUTPATIENT
Start: 2020-09-28 | End: 2021-03-29 | Stop reason: SDUPTHER

## 2020-09-28 NOTE — ASSESSMENT & PLAN NOTE
Uncontrolled  Will increase lisinopril to 20 mg daily    Discussed with patient importance of weight loss, low-sodium diet, and routine exercise

## 2020-09-28 NOTE — PATIENT INSTRUCTIONS
Will increase lisinopril to 20mg daily  sporatically monitor BP - consider investing in a BP cuff    Work on diet, exercise and weight loss    Will check labs - I will call you with results    Get sleep study completed when get insurance

## 2020-09-28 NOTE — PROGRESS NOTES
Assessment/Plan:    Patient presents for routine follow-up  No new blood work to review  He was given a paper copy for cash pay labs through [facility] lab for CBC, CMP and lipid panel as patient does not currently have insurance  Problem List Items Addressed This Visit        Respiratory    Observed sleep apnea - Primary      Patient with daytime fatigue, snoring, witnessed apnea  Risk factors for JM:  Morbid obesity, large neck circumference, hypertension  Need to check a home sleep study  Unfortunately patient does not have insurance so he has not followed up for testing to complete completed  Discussed with patient the long-term complications for untreated JM  He will consider when he gets insurance            Cardiovascular and Mediastinum    Benign essential HTN      Uncontrolled  Will increase lisinopril to 20 mg daily  Discussed with patient importance of weight loss, low-sodium diet, and routine exercise         Relevant Medications    lisinopril (ZESTRIL) 20 mg tablet       Other    Anxiety      Controlled  Continue Prozac 40 mg daily         Relevant Medications    FLUoxetine (PROzac) 40 MG capsule    Morbid obesity with BMI of 45 0-49 9, adult ([facility] )      Discussed with patient the importance of weight loss           Other Visit Diagnoses     Essential hypertension, benign        Relevant Medications    lisinopril (ZESTRIL) 20 mg tablet        Health Maintenance Items:  - BMI Counseling: Body mass index is 46 04 kg/m²  The BMI is above normal  Nutrition recommendations include decreasing portion sizes, encouraging healthy choices of fruits and vegetables, decreasing fast food intake, consuming healthier snacks, limiting drinks that contain sugar, moderation in carbohydrate intake, increasing intake of lean protein and reducing intake of cholesterol  Exercise recommendations include exercising 3-5 times per week and strength training exercises  No pharmacotherapy was ordered  M*[employer] software was used to dictate this note  It may contain errors with dictating incorrect words or incorrect spelling  Please contact the provider directly with any questions  Subjective:      Patient ID: Arabella Munson is a 37 y o  male  HPI    Pt presents for routine follow-up  No concerns  Not checking BP @ home - does not have cuff    Did not get sleep study due to financial reasons    Recently got a peloton spin bicycle, and starting to work out  + weight gain since last seen    Reports anxiety and depression doing okay  No concerns with fluoxetine  No panic attacks, feelings of worthlessness or guilt    The following portions of the patient's history were reviewed and updated as appropriate: allergies, current medications, past family history, past medical history, past social history, past surgical history and problem list     Review of Systems   Constitutional: Negative for appetite change, chills, fatigue, fever and unexpected weight change  Respiratory: Negative for cough, choking, shortness of breath and wheezing  Cardiovascular: Negative for chest pain, palpitations and leg swelling  Gastrointestinal: Negative for abdominal pain, constipation, diarrhea, nausea and vomiting  Neurological: Negative for dizziness, weakness, light-headedness and headaches  Psychiatric/Behavioral: Negative for dysphoric mood and sleep disturbance  The patient is not nervous/anxious            Past Medical History:   Diagnosis Date    Anxiety 8/17/2016    Benign essential hypertension 7/18/2016    Eustachian tube disorder     Fibromatosis, plantar     Morbid obesity with BMI of 45 0-49 9, [facility] 3[facility]27/2017         Current Outpatient Medications:     FLUoxetine (PROzac) 40 MG capsule, Take 1 capsule (40 mg total) by mouth daily, Disp: 90 capsule, Rfl: 1    lisinopril (ZESTRIL) 20 mg tablet, Take 1 tablet (20 mg total) by mouth daily, Disp: 90 tablet, Rfl: 1    No Known Allergies    Social History   Past Surgical History:   Procedure Laterality Date    APPENDECTOMY      BONE GRAFT      Bone replacement graft     Family History   Problem Relation Age of Onset    No Known Problems Mother     No Known Problems Father        Objective:  /90 (BP Location: Left arm, Patient Position: Sitting, Cuff Size: Large)   Pulse 73   Temp (!) 97 3 °F (36 3 °C) (Temporal)   Ht 6' 2 8" (1 9 m)   Wt (!) 166 kg (366 lb 6 4 oz)   SpO2 98%   BMI 46 04 kg/m²      Physical Exam  Vitals signs and nursing note reviewed  Constitutional:       General: He is not in acute distress  Appearance: He is well-developed  He is not ill-appearing, toxic-appearing or diaphoretic  Comments:  Morbidly obese   HENT:      Head: Normocephalic and atraumatic  Cardiovascular:      Rate and Rhythm: Normal rate and regular rhythm  Heart sounds: No murmur  Comments: No pedal edema  Pulmonary:      Effort: Pulmonary effort is normal  No respiratory distress  Breath sounds: Normal breath sounds  No wheezing  Skin:     General: Skin is warm and dry  Coloration: Skin is not pale  Neurological:      General: No focal deficit present  Mental Status: He is alert and oriented to person, place, and time  Psychiatric:         Mood and Affect: Mood normal          Behavior: Behavior normal          Thought Content:  Thought content normal          Judgment: Judgment normal

## 2020-09-28 NOTE — ASSESSMENT & PLAN NOTE
Patient with daytime fatigue, snoring, witnessed apnea  Risk factors for JM:  Morbid obesity, large neck circumference, hypertension  Need to check a home sleep study  Unfortunately patient does not have insurance so he has not followed up for testing to complete completed  Discussed with patient the long-term complications for untreated JM    He will consider when he gets insurance

## 2020-12-07 ENCOUNTER — OFFICE VISIT (OUTPATIENT)
Dept: URGENT CARE | Age: 35
End: 2020-12-07
Payer: OTHER GOVERNMENT

## 2020-12-07 VITALS
TEMPERATURE: 98.6 F | WEIGHT: 315 LBS | BODY MASS INDEX: 40.43 KG/M2 | HEIGHT: 74 IN | HEART RATE: 112 BPM | OXYGEN SATURATION: 98 % | RESPIRATION RATE: 20 BRPM

## 2020-12-07 DIAGNOSIS — R43.2 LOSS OF TASTE: Primary | ICD-10-CM

## 2020-12-07 DIAGNOSIS — R51.9 ACUTE NONINTRACTABLE HEADACHE, UNSPECIFIED HEADACHE TYPE: ICD-10-CM

## 2020-12-07 DIAGNOSIS — R43.0 LOSS OF SMELL: ICD-10-CM

## 2020-12-07 PROCEDURE — U0003 INFECTIOUS AGENT DETECTION BY NUCLEIC ACID (DNA OR RNA); SEVERE ACUTE RESPIRATORY SYNDROME CORONAVIRUS 2 (SARS-COV-2) (CORONAVIRUS DISEASE [COVID-19]), AMPLIFIED PROBE TECHNIQUE, MAKING USE OF HIGH THROUGHPUT TECHNOLOGIES AS DESCRIBED BY CMS-2020-01-R: HCPCS | Performed by: PHYSICIAN ASSISTANT

## 2020-12-07 PROCEDURE — G0381 LEV 2 HOSP TYPE B ED VISIT: HCPCS | Performed by: PHYSICIAN ASSISTANT

## 2020-12-09 LAB — SARS-COV-2 RNA SPEC QL NAA+PROBE: DETECTED

## 2020-12-10 ENCOUNTER — TELEPHONE (OUTPATIENT)
Dept: URGENT CARE | Age: 35
End: 2020-12-10

## 2021-03-29 DIAGNOSIS — I10 ESSENTIAL HYPERTENSION, BENIGN: ICD-10-CM

## 2021-03-29 DIAGNOSIS — F41.9 ANXIETY: ICD-10-CM

## 2021-03-29 RX ORDER — LISINOPRIL 20 MG/1
20 TABLET ORAL DAILY
Qty: 90 TABLET | Refills: 1 | Status: SHIPPED | OUTPATIENT
Start: 2021-03-29 | End: 2021-10-06 | Stop reason: SDUPTHER

## 2021-03-29 RX ORDER — FLUOXETINE HYDROCHLORIDE 40 MG/1
40 CAPSULE ORAL DAILY
Qty: 90 CAPSULE | Refills: 1 | Status: SHIPPED | OUTPATIENT
Start: 2021-03-29 | End: 2021-10-06 | Stop reason: SDUPTHER

## 2021-10-06 DIAGNOSIS — F41.9 ANXIETY: ICD-10-CM

## 2021-10-06 DIAGNOSIS — I10 ESSENTIAL HYPERTENSION, BENIGN: ICD-10-CM

## 2021-10-07 RX ORDER — FLUOXETINE HYDROCHLORIDE 40 MG/1
40 CAPSULE ORAL DAILY
Qty: 90 CAPSULE | Refills: 1 | Status: SHIPPED | OUTPATIENT
Start: 2021-10-07 | End: 2022-04-13 | Stop reason: SDUPTHER

## 2021-10-07 RX ORDER — LISINOPRIL 20 MG/1
20 TABLET ORAL DAILY
Qty: 90 TABLET | Refills: 1 | Status: SHIPPED | OUTPATIENT
Start: 2021-10-07 | End: 2022-04-13 | Stop reason: SDUPTHER

## 2022-04-11 DIAGNOSIS — I10 ESSENTIAL HYPERTENSION, BENIGN: ICD-10-CM

## 2022-04-11 DIAGNOSIS — F41.9 ANXIETY: ICD-10-CM

## 2022-04-13 ENCOUNTER — OFFICE VISIT (OUTPATIENT)
Dept: INTERNAL MEDICINE CLINIC | Facility: CLINIC | Age: 37
End: 2022-04-13
Payer: COMMERCIAL

## 2022-04-13 VITALS
TEMPERATURE: 97.7 F | HEIGHT: 74 IN | DIASTOLIC BLOOD PRESSURE: 84 MMHG | HEART RATE: 74 BPM | WEIGHT: 315 LBS | SYSTOLIC BLOOD PRESSURE: 110 MMHG | BODY MASS INDEX: 40.43 KG/M2 | OXYGEN SATURATION: 97 %

## 2022-04-13 DIAGNOSIS — Z13.0 SCREENING FOR DEFICIENCY ANEMIA: ICD-10-CM

## 2022-04-13 DIAGNOSIS — G47.30 OBSERVED SLEEP APNEA: ICD-10-CM

## 2022-04-13 DIAGNOSIS — I10 BENIGN ESSENTIAL HTN: ICD-10-CM

## 2022-04-13 DIAGNOSIS — E66.01 MORBID OBESITY WITH BMI OF 45.0-49.9, ADULT (HCC): ICD-10-CM

## 2022-04-13 DIAGNOSIS — I10 ESSENTIAL HYPERTENSION, BENIGN: Primary | ICD-10-CM

## 2022-04-13 DIAGNOSIS — Z13.220 SCREENING FOR LIPID DISORDERS: ICD-10-CM

## 2022-04-13 DIAGNOSIS — F34.1 DYSTHYMIA: ICD-10-CM

## 2022-04-13 DIAGNOSIS — F41.9 ANXIETY: ICD-10-CM

## 2022-04-13 PROCEDURE — 3725F SCREEN DEPRESSION PERFORMED: CPT | Performed by: NURSE PRACTITIONER

## 2022-04-13 PROCEDURE — 99214 OFFICE O/P EST MOD 30 MIN: CPT | Performed by: NURSE PRACTITIONER

## 2022-04-13 PROCEDURE — 3008F BODY MASS INDEX DOCD: CPT | Performed by: NURSE PRACTITIONER

## 2022-04-13 RX ORDER — FLUOXETINE HYDROCHLORIDE 40 MG/1
40 CAPSULE ORAL DAILY
Qty: 90 CAPSULE | Refills: 1 | Status: SHIPPED | OUTPATIENT
Start: 2022-04-13 | End: 2022-07-20 | Stop reason: SDUPTHER

## 2022-04-13 RX ORDER — LISINOPRIL 20 MG/1
20 TABLET ORAL DAILY
Qty: 90 TABLET | Refills: 1 | Status: SHIPPED | OUTPATIENT
Start: 2022-04-13 | End: 2022-04-13 | Stop reason: SDUPTHER

## 2022-04-13 RX ORDER — BUPROPION HYDROCHLORIDE 150 MG/1
150 TABLET ORAL EVERY MORNING
Qty: 30 TABLET | Refills: 1 | Status: SHIPPED | OUTPATIENT
Start: 2022-04-13 | End: 2022-06-08 | Stop reason: SDUPTHER

## 2022-04-13 RX ORDER — LISINOPRIL 20 MG/1
20 TABLET ORAL DAILY
Qty: 90 TABLET | Refills: 1 | Status: SHIPPED | OUTPATIENT
Start: 2022-04-13 | End: 2022-07-20 | Stop reason: SDUPTHER

## 2022-04-13 RX ORDER — FLUOXETINE HYDROCHLORIDE 40 MG/1
40 CAPSULE ORAL DAILY
Qty: 90 CAPSULE | Refills: 1 | Status: SHIPPED | OUTPATIENT
Start: 2022-04-13 | End: 2022-04-13 | Stop reason: SDUPTHER

## 2022-04-13 RX ORDER — BUPROPION HYDROCHLORIDE 150 MG/1
150 TABLET ORAL EVERY MORNING
Qty: 30 TABLET | Refills: 1 | Status: SHIPPED | OUTPATIENT
Start: 2022-04-13 | End: 2022-04-13 | Stop reason: SDUPTHER

## 2022-04-13 NOTE — ASSESSMENT & PLAN NOTE
- discussed risks of HTN, CVA, cardiovascular events with untreated sleep apnea  - sleep test ordered  - encouraged patient to follow up

## 2022-04-13 NOTE — ASSESSMENT & PLAN NOTE
- > 20 lb weight loss   - discussed increase routine exercise to 30 minutes 5x a week at least  Alternating cardio and strength training  - healthy diet, fruits/vegetables/lean protein/whole grains  - continue to monitor weight

## 2022-04-13 NOTE — ASSESSMENT & PLAN NOTE
- continue lisinopril 20mg daily  - low sodium diet, exercise 30 minutes 5x a week and heart healthy diet

## 2022-04-13 NOTE — PROGRESS NOTES
Assessment/Plan:    Problem List Items Addressed This Visit        Respiratory    Observed sleep apnea     - discussed risks of HTN, CVA, cardiovascular events with untreated sleep apnea  - sleep test ordered  - encouraged patient to follow up          Relevant Orders    Diagnostic Sleep Study       Other    Anxiety     - controlled on prozac 40mg daily          Relevant Medications    FLUoxetine (PROzac) 40 MG capsule    Other Relevant Orders    TSH, 3rd generation with Free T4 reflex    Morbid obesity with BMI of 45 0-49 9, adult (HCC)     - > 20 lb weight loss   - discussed increase routine exercise to 30 minutes 5x a week at least  Alternating cardio and strength training  - healthy diet, fruits/vegetables/lean protein/whole grains  - continue to monitor weight          Dysthymia     - start wellbutrin XL 150mg daily   - follow up in 4 weeks           Relevant Medications    buPROPion (Wellbutrin XL) 150 mg 24 hr tablet    FLUoxetine (PROzac) 40 MG capsule    Other Relevant Orders    TSH, 3rd generation with Free T4 reflex      Other Visit Diagnoses     Essential hypertension, benign    -  Primary    Relevant Medications    lisinopril (ZESTRIL) 20 mg tablet    Other Relevant Orders    Diagnostic Sleep Study    Comprehensive metabolic panel    UA w Reflex to Microscopic w Reflex to Culture -Lab Collect    Screening for deficiency anemia        Relevant Orders    CBC and differential    Screening for lipid disorders        Relevant Orders    Lipid Panel with Direct LDL reflex          BMI Counseling: Body mass index is 43 33 kg/m²  Discussed the patient's BMI with him  The BMI is above normal  Nutrition recommendations include 3-5 servings of fruits/vegetables daily, moderation in carbohydrate intake, increasing intake of lean protein and reducing intake of saturated fat and trans fat  Exercise recommendations include moderate aerobic physical activity for 150 minutes/week        M*[employer] software was used to dictate this note  It may contain errors with dictating incorrect words or incorrect spelling  Please contact the provider directly with any questions  Subjective:      Patient ID: Summer Greene is a 31 y o  male  HPI    Patient presents today for routine follow up  No recent labs  He was last seen in over office in Sept 2020   He has lost > 20 lbs since his last visit  He has been watching his diet and exercising occasionally  HTN - currently on lisinopril 20mg daily  He does not monitor his BP at home     Anxiety - controlled  Depression - 1-2 times a week has symptoms  Symptoms include feeling unmotivated, feeling hopeless, unable to feel happiness/laugh somedays but not everyday  No SI or HI  PHQ-2/9 Depression Screening    Little interest or pleasure in doing things: 0 - not at all  Feeling down, depressed, or hopeless: 0 - not at all  PHQ-2 Score: 0  PHQ-2 Interpretation: Negative depression screen         The following portions of the patient's history were reviewed and updated as appropriate: allergies, current medications, past family history, past medical history, past social history, past surgical history and problem list     Review of Systems   Constitutional: Negative for activity change, appetite change, chills, fever and unexpected weight change  Eyes: Negative for visual disturbance  Respiratory: Negative for cough, chest tightness and shortness of breath  Cardiovascular: Negative for chest pain and leg swelling  Neurological: Negative for headaches  Psychiatric/Behavioral: Positive for dysphoric mood and sleep disturbance  Negative for self-injury and suicidal ideas  The patient is not nervous/anxious            Past Medical History:   Diagnosis Date    Anxiety 8/17/2016    Benign essential hypertension 7/18/2016    Eustachian tube disorder     Fibromatosis, plantar     Hypertension     Morbid obesity with BMI of 45 0-49 9, adult ([facility] ) 3/27/2017         Current Outpatient Medications:     FLUoxetine (PROzac) 40 MG capsule, Take 1 capsule (40 mg total) by mouth daily, Disp: 90 capsule, Rfl: 1    lisinopril (ZESTRIL) 20 mg tablet, Take 1 tablet (20 mg total) by mouth daily, Disp: 90 tablet, Rfl: 1    buPROPion (Wellbutrin XL) 150 mg 24 hr tablet, Take 1 tablet (150 mg total) by mouth every morning, Disp: 30 tablet, Rfl: 1    No Known Allergies    Social History   Past Surgical History:   Procedure Laterality Date    APPENDECTOMY      BONE GRAFT      Bone replacement graft     Family History   Problem Relation Age of Onset    No Known Problems Mother     No Known Problems Father        Objective:  /84 (BP Location: Left arm, Patient Position: Sitting, Cuff Size: Standard)   Pulse 74   Temp 97 7 °F (36 5 °C) (Tympanic)   Ht 6' 2 41" (1 89 m)   Wt (!) 155 kg (341 lb 3 2 oz)   SpO2 97%   BMI 43 33 kg/m²      Physical Exam  Vitals reviewed  Constitutional:       General: He is not in acute distress  Appearance: Normal appearance  He is obese  He is not diaphoretic  HENT:      Head: Normocephalic and atraumatic  Right Ear: Tympanic membrane and external ear normal       Left Ear: Tympanic membrane and external ear normal       Mouth/Throat:      Mouth: Mucous membranes are moist       Pharynx: Oropharynx is clear  No oropharyngeal exudate or posterior oropharyngeal erythema  Eyes:      Extraocular Movements: Extraocular movements intact  Conjunctiva/sclera: Conjunctivae normal       Pupils: Pupils are equal, round, and reactive to light  Neck:      Vascular: No carotid bruit  Cardiovascular:      Rate and Rhythm: Normal rate and regular rhythm  Heart sounds: Normal heart sounds  No murmur heard  Pulmonary:      Effort: Pulmonary effort is normal  No respiratory distress  Breath sounds: Normal breath sounds  No wheezing, rhonchi or rales  Skin:     General: Skin is warm and dry     Neurological:      Mental Status: He is alert and oriented to person, place, and time  Mental status is at baseline     Psychiatric:         Mood and Affect: Mood normal          Behavior: Behavior normal

## 2022-05-03 ENCOUNTER — RA CDI HCC (OUTPATIENT)
Dept: OTHER | Facility: HOSPITAL | Age: 37
End: 2022-05-03

## 2022-05-03 NOTE — PROGRESS NOTES
[facility]  coding opportunities       Chart reviewed, no opportunity found: CHART REVIEWED, NO OPPORTUNITY FOUND        Patients Insurance        Commercial Insurance: [address] [place] [address]

## 2022-05-06 ENCOUNTER — TELEPHONE (OUTPATIENT)
Dept: SLEEP CENTER | Facility: CLINIC | Age: 37
End: 2022-05-06

## 2022-05-06 NOTE — TELEPHONE ENCOUNTER
----- Message from Desmond Greene MD sent at 5/5/2022  5:19 PM EDT -----  Jaqueline Hopkins    ----- Message -----  From: Kayce Ugalde  Sent: 5/3/2022   8:29 AM EDT  To: Sleep Medicine Aleksandar Provider    This sleep study needs approval      If approved please sign and return to clerical pool  If denied please include reasons why  Also provide alternative testing if warranted  Please sign and return to clerical pool

## 2022-05-09 ENCOUNTER — APPOINTMENT (OUTPATIENT)
Dept: LAB | Age: 37
End: 2022-05-09
Payer: COMMERCIAL

## 2022-05-09 DIAGNOSIS — Z13.220 SCREENING FOR LIPID DISORDERS: ICD-10-CM

## 2022-05-09 DIAGNOSIS — Z13.0 SCREENING FOR DEFICIENCY ANEMIA: ICD-10-CM

## 2022-05-09 DIAGNOSIS — I10 ESSENTIAL HYPERTENSION, BENIGN: ICD-10-CM

## 2022-05-09 DIAGNOSIS — F41.9 ANXIETY: ICD-10-CM

## 2022-05-09 DIAGNOSIS — F34.1 DYSTHYMIA: ICD-10-CM

## 2022-05-09 LAB
ALBUMIN SERPL BCP-MCNC: 3.5 G/DL (ref 3.5–5)
ALP SERPL-CCNC: 80 U/L (ref 46–116)
ALT SERPL W P-5'-P-CCNC: 37 U/L (ref 12–78)
ANION GAP SERPL CALCULATED.3IONS-SCNC: 2 MMOL/L (ref 4–13)
AST SERPL W P-5'-P-CCNC: 22 U/L (ref 5–45)
BACTERIA UR QL AUTO: ABNORMAL /HPF
BASOPHILS # BLD AUTO: 0.08 THOUSANDS/ΜL (ref 0–0.1)
BASOPHILS NFR BLD AUTO: 1 % (ref 0–1)
BILIRUB SERPL-MCNC: 0.7 MG/DL (ref 0.2–1)
BILIRUB UR QL STRIP: NEGATIVE
BUN SERPL-MCNC: 16 MG/DL (ref 5–25)
CALCIUM SERPL-MCNC: 9.1 MG/DL (ref 8.3–10.1)
CHLORIDE SERPL-SCNC: 106 MMOL/L (ref 100–108)
CHOLEST SERPL-MCNC: 148 MG/DL
CLARITY UR: CLEAR
CO2 SERPL-SCNC: 30 MMOL/L (ref 21–32)
COLOR UR: ABNORMAL
CREAT SERPL-MCNC: 0.81 MG/DL (ref 0.6–1.3)
EOSINOPHIL # BLD AUTO: 0.11 THOUSAND/ΜL (ref 0–0.61)
EOSINOPHIL NFR BLD AUTO: 1 % (ref 0–6)
ERYTHROCYTE [DISTWIDTH] IN BLOOD BY AUTOMATED COUNT: 12.9 % (ref 11.6–15.1)
GFR SERPL CREATININE-BSD FRML MDRD: 115 ML/MIN/1.73SQ M
GLUCOSE P FAST SERPL-MCNC: 104 MG/DL (ref 65–99)
GLUCOSE UR STRIP-MCNC: NEGATIVE MG/DL
HCT VFR BLD AUTO: 44.9 % (ref 36.5–49.3)
HDLC SERPL-MCNC: 47 MG/DL
HGB BLD-MCNC: 14.2 G/DL (ref 12–17)
HGB UR QL STRIP.AUTO: ABNORMAL
IMM GRANULOCYTES # BLD AUTO: 0.09 THOUSAND/UL (ref 0–0.2)
IMM GRANULOCYTES NFR BLD AUTO: 1 % (ref 0–2)
KETONES UR STRIP-MCNC: NEGATIVE MG/DL
LDLC SERPL CALC-MCNC: 86 MG/DL (ref 0–100)
LEUKOCYTE ESTERASE UR QL STRIP: NEGATIVE
LYMPHOCYTES # BLD AUTO: 2.22 THOUSANDS/ΜL (ref 0.6–4.47)
LYMPHOCYTES NFR BLD AUTO: 26 % (ref 14–44)
MCH RBC QN AUTO: 28.7 PG (ref 26.8–34.3)
MCHC RBC AUTO-ENTMCNC: 31.6 G/DL (ref 31.4–37.4)
MCV RBC AUTO: 91 FL (ref 82–98)
MONOCYTES # BLD AUTO: 0.75 THOUSAND/ΜL (ref 0.17–1.22)
MONOCYTES NFR BLD AUTO: 9 % (ref 4–12)
MUCOUS THREADS UR QL AUTO: ABNORMAL
NEUTROPHILS # BLD AUTO: 5.23 THOUSANDS/ΜL (ref 1.85–7.62)
NEUTS SEG NFR BLD AUTO: 62 % (ref 43–75)
NITRITE UR QL STRIP: NEGATIVE
NON-SQ EPI CELLS URNS QL MICRO: ABNORMAL /HPF
NRBC BLD AUTO-RTO: 0 /100 WBCS
PH UR STRIP.AUTO: 6.5 [PH]
PLATELET # BLD AUTO: 269 THOUSANDS/UL (ref 149–390)
PMV BLD AUTO: 11.1 FL (ref 8.9–12.7)
POTASSIUM SERPL-SCNC: 4.1 MMOL/L (ref 3.5–5.3)
PROT SERPL-MCNC: 7.6 G/DL (ref 6.4–8.2)
PROT UR STRIP-MCNC: NEGATIVE MG/DL
RBC # BLD AUTO: 4.95 MILLION/UL (ref 3.88–5.62)
RBC #/AREA URNS AUTO: ABNORMAL /HPF
SODIUM SERPL-SCNC: 138 MMOL/L (ref 136–145)
SP GR UR STRIP.AUTO: 1.02 (ref 1–1.03)
TRIGL SERPL-MCNC: 77 MG/DL
TSH SERPL DL<=0.05 MIU/L-ACNC: 0.95 UIU/ML (ref 0.45–4.5)
UROBILINOGEN UR STRIP-ACNC: <2 MG/DL
WBC # BLD AUTO: 8.48 THOUSAND/UL (ref 4.31–10.16)
WBC #/AREA URNS AUTO: ABNORMAL /HPF

## 2022-05-09 PROCEDURE — 36415 COLL VENOUS BLD VENIPUNCTURE: CPT

## 2022-05-09 PROCEDURE — 80061 LIPID PANEL: CPT

## 2022-05-09 PROCEDURE — 84443 ASSAY THYROID STIM HORMONE: CPT

## 2022-05-09 PROCEDURE — 81001 URINALYSIS AUTO W/SCOPE: CPT

## 2022-05-09 PROCEDURE — 80053 COMPREHEN METABOLIC PANEL: CPT

## 2022-05-09 PROCEDURE — 85025 COMPLETE CBC W/AUTO DIFF WBC: CPT

## 2022-05-10 ENCOUNTER — TELEPHONE (OUTPATIENT)
Dept: INTERNAL MEDICINE CLINIC | Facility: CLINIC | Age: 37
End: 2022-05-10

## 2022-05-10 DIAGNOSIS — F34.1 DYSTHYMIA: ICD-10-CM

## 2022-05-10 NOTE — TELEPHONE ENCOUNTER
Patient is requesting Rx refill for buPROPion (Wellbutrin XL) 150 mg 24 hr tablet      Send to [facility]/pharmacy #[id number] Cummings[address]- 4/13/22  NOV- 6/8/22

## 2022-06-08 ENCOUNTER — OFFICE VISIT (OUTPATIENT)
Dept: INTERNAL MEDICINE CLINIC | Facility: CLINIC | Age: 37
End: 2022-06-08
Payer: COMMERCIAL

## 2022-06-08 VITALS
SYSTOLIC BLOOD PRESSURE: 110 MMHG | TEMPERATURE: 97.5 F | BODY MASS INDEX: 40.43 KG/M2 | OXYGEN SATURATION: 98 % | WEIGHT: 315 LBS | HEIGHT: 74 IN | DIASTOLIC BLOOD PRESSURE: 80 MMHG | HEART RATE: 65 BPM

## 2022-06-08 DIAGNOSIS — F34.1 DYSTHYMIA: ICD-10-CM

## 2022-06-08 DIAGNOSIS — R31.9 HEMATURIA, UNSPECIFIED TYPE: Primary | ICD-10-CM

## 2022-06-08 DIAGNOSIS — G47.30 OBSERVED SLEEP APNEA: ICD-10-CM

## 2022-06-08 DIAGNOSIS — F41.9 ANXIETY: ICD-10-CM

## 2022-06-08 LAB
BACTERIA UR QL AUTO: ABNORMAL /HPF
BILIRUB UR QL STRIP: NEGATIVE
CLARITY UR: CLEAR
COLOR UR: ABNORMAL
GLUCOSE UR STRIP-MCNC: NEGATIVE MG/DL
HGB UR QL STRIP.AUTO: ABNORMAL
KETONES UR STRIP-MCNC: NEGATIVE MG/DL
LEUKOCYTE ESTERASE UR QL STRIP: NEGATIVE
MUCOUS THREADS UR QL AUTO: ABNORMAL
NITRITE UR QL STRIP: NEGATIVE
NON-SQ EPI CELLS URNS QL MICRO: ABNORMAL /HPF
PH UR STRIP.AUTO: 7 [PH]
PROT UR STRIP-MCNC: NEGATIVE MG/DL
RBC #/AREA URNS AUTO: ABNORMAL /HPF
SL AMB  POCT GLUCOSE, UA: NEGATIVE
SL AMB LEUKOCYTE ESTERASE,UA: NEGATIVE
SL AMB POCT BILIRUBIN,UA: NEGATIVE
SL AMB POCT BLOOD,UA: NORMAL
SL AMB POCT CLARITY,UA: CLEAR
SL AMB POCT COLOR,UA: YELLOW
SL AMB POCT KETONES,UA: NEGATIVE
SL AMB POCT NITRITE,UA: NEGATIVE
SL AMB POCT PH,UA: 7
SL AMB POCT SPECIFIC GRAVITY,UA: 1.02
SL AMB POCT URINE PROTEIN: NEGATIVE
SL AMB POCT UROBILINOGEN: NORMAL
SP GR UR STRIP.AUTO: 1.02 (ref 1–1.03)
UROBILINOGEN UR STRIP-ACNC: <2 MG/DL
WBC #/AREA URNS AUTO: ABNORMAL /HPF

## 2022-06-08 PROCEDURE — 3725F SCREEN DEPRESSION PERFORMED: CPT | Performed by: NURSE PRACTITIONER

## 2022-06-08 PROCEDURE — 81001 URINALYSIS AUTO W/SCOPE: CPT | Performed by: NURSE PRACTITIONER

## 2022-06-08 PROCEDURE — 99214 OFFICE O/P EST MOD 30 MIN: CPT | Performed by: NURSE PRACTITIONER

## 2022-06-08 PROCEDURE — 3008F BODY MASS INDEX DOCD: CPT | Performed by: NURSE PRACTITIONER

## 2022-06-08 PROCEDURE — 81003 URINALYSIS AUTO W/O SCOPE: CPT | Performed by: NURSE PRACTITIONER

## 2022-06-08 RX ORDER — BUPROPION HYDROCHLORIDE 300 MG/1
300 TABLET ORAL EVERY MORNING
Qty: 30 TABLET | Refills: 1 | Status: SHIPPED | OUTPATIENT
Start: 2022-06-08 | End: 2022-07-20 | Stop reason: SDUPTHER

## 2022-06-08 NOTE — ASSESSMENT & PLAN NOTE
- improved with Wellbutrin  - increase to Wellbutrin  mg daily  - continue Prozac 40 mg daily  - follow-up 1 month

## 2022-06-08 NOTE — ASSESSMENT & PLAN NOTE
- incidental finding on UA, asymptomatic  - present on repeat urine  - check ultrasound kidney and bladder  - follow-up 1 month

## 2022-06-08 NOTE — PROGRESS NOTES
Assessment/Plan:    Problem List Items Addressed This Visit        Respiratory    Observed sleep apnea     - sleep study scheduled in September              Other    Anxiety     - controlled on Prozac 40 mg daily           Dysthymia     - improved with Wellbutrin  - increase to Wellbutrin  mg daily  - continue Prozac 40 mg daily  - follow-up 1 month           Relevant Medications    buPROPion (Wellbutrin XL) 300 mg 24 hr tablet    Hematuria - Primary     - incidental finding on UA, asymptomatic  - present on repeat urine  - check ultrasound kidney and bladder  - follow-up 1 month           Relevant Orders    POCT urine dip auto non-scope (Completed)    US kidney and bladder    Urinalysis with microscopic          M*Modal software was used to dictate this note  It may contain errors with dictating incorrect words or incorrect spelling  Please contact the provider directly with any questions  Subjective:      Patient ID: Triny Ha is a 31 y o  male  HPI    Patient presents today for 1 month follow up  Labs completed 5/9  CBC within normal limits  CMP showed mildly elevated fasting glucose at 104  Lipid panel at goal  TSH normal  UA showed a small amount of blood, microscopic showed 4-10 rbc's  Pt is asymptomatic    He reports an improvement in his depression since starting Wellbutrin  mg last month  He feels that he is not quite where he wants to be, but is improved  He feels depressed less often, is able to redirect his thoughts more quickly and able to move past his depressive symptoms more quickly  The following portions of the patient's history were reviewed and updated as appropriate: allergies, current medications, past family history, past medical history, past social history, past surgical history and problem list     Review of Systems   Constitutional: Negative for chills and fever  Genitourinary: Negative for difficulty urinating, dysuria, flank pain and hematuria  Psychiatric/Behavioral: Positive for dysphoric mood  The patient is not nervous/anxious  Past Medical History:   Diagnosis Date    Anxiety 8/17/2016    Benign essential hypertension 7/18/2016    Eustachian tube disorder     Fibromatosis, plantar     Hypertension     Morbid obesity with BMI of 45 0-49 9, adult ([facility] ) 3/27/2017         Current Outpatient Medications:     buPROPion (Wellbutrin XL) 300 mg 24 hr tablet, Take 1 tablet (300 mg total) by mouth every morning, Disp: 30 tablet, Rfl: 1    FLUoxetine (PROzac) 40 MG capsule, Take 1 capsule (40 mg total) by mouth daily, Disp: 90 capsule, Rfl: 1    lisinopril (ZESTRIL) 20 mg tablet, Take 1 tablet (20 mg total) by mouth daily, Disp: 90 tablet, Rfl: 1    No Known Allergies    Social History   Past Surgical History:   Procedure Laterality Date    APPENDECTOMY      BONE GRAFT      Bone replacement graft     Family History   Problem Relation Age of Onset    No Known Problems Mother     No Known Problems Father        Objective:  /80 (BP Location: Left arm, Patient Position: Sitting, Cuff Size: Large)   Pulse 65   Temp 97 5 °F (36 4 °C) (Temporal)   Ht 6' 2 02" (1 88 m)   Wt (!) 157 kg (345 lb 9 6 oz)   SpO2 98%   BMI 44 35 kg/m²      Physical Exam  Vitals reviewed  Constitutional:       General: He is not in acute distress  Appearance: Normal appearance  He is obese  He is not diaphoretic  HENT:      Head: Normocephalic and atraumatic  Eyes:      Extraocular Movements: Extraocular movements intact  Conjunctiva/sclera: Conjunctivae normal       Pupils: Pupils are equal, round, and reactive to light  Cardiovascular:      Rate and Rhythm: Normal rate and regular rhythm  Heart sounds: Normal heart sounds  Pulmonary:      Effort: Pulmonary effort is normal  No respiratory distress  Breath sounds: Normal breath sounds  No wheezing, rhonchi or rales  Abdominal:      Palpations: Abdomen is soft        Tenderness: There is no abdominal tenderness  There is no right CVA tenderness or left CVA tenderness  Neurological:      Mental Status: He is alert  Mental status is at baseline     Psychiatric:         Mood and Affect: Mood normal          Behavior: Behavior normal

## 2022-06-13 ENCOUNTER — HOSPITAL ENCOUNTER (OUTPATIENT)
Dept: RADIOLOGY | Age: 37
Discharge: HOME | End: 2022-06-13
Payer: COMMERCIAL

## 2022-06-13 DIAGNOSIS — R31.9 HEMATURIA, UNSPECIFIED TYPE: ICD-10-CM

## 2022-06-13 PROCEDURE — 76770 US EXAM ABDO BACK WALL COMP: CPT

## 2022-07-20 ENCOUNTER — OFFICE VISIT (OUTPATIENT)
Dept: INTERNAL MEDICINE CLINIC | Facility: CLINIC | Age: 37
End: 2022-07-20
Payer: COMMERCIAL

## 2022-07-20 VITALS
HEIGHT: 74 IN | TEMPERATURE: 97.7 F | BODY MASS INDEX: 40.43 KG/M2 | HEART RATE: 68 BPM | WEIGHT: 315 LBS | OXYGEN SATURATION: 98 % | DIASTOLIC BLOOD PRESSURE: 72 MMHG | SYSTOLIC BLOOD PRESSURE: 126 MMHG

## 2022-07-20 DIAGNOSIS — F34.1 DYSTHYMIA: ICD-10-CM

## 2022-07-20 DIAGNOSIS — I10 BENIGN ESSENTIAL HTN: ICD-10-CM

## 2022-07-20 DIAGNOSIS — I10 ESSENTIAL HYPERTENSION, BENIGN: ICD-10-CM

## 2022-07-20 DIAGNOSIS — G47.30 OBSERVED SLEEP APNEA: ICD-10-CM

## 2022-07-20 DIAGNOSIS — R31.21 ASYMPTOMATIC MICROSCOPIC HEMATURIA: ICD-10-CM

## 2022-07-20 DIAGNOSIS — R31.9 HEMATURIA, UNSPECIFIED TYPE: Primary | ICD-10-CM

## 2022-07-20 DIAGNOSIS — F41.9 ANXIETY: ICD-10-CM

## 2022-07-20 LAB
BACTERIA UR QL AUTO: ABNORMAL /HPF
BILIRUB UR QL STRIP: NEGATIVE
CLARITY UR: CLEAR
COLOR UR: YELLOW
GLUCOSE UR STRIP-MCNC: NEGATIVE MG/DL
HGB UR QL STRIP.AUTO: ABNORMAL
KETONES UR STRIP-MCNC: NEGATIVE MG/DL
LEUKOCYTE ESTERASE UR QL STRIP: NEGATIVE
MUCOUS THREADS UR QL AUTO: ABNORMAL
NITRITE UR QL STRIP: NEGATIVE
NON-SQ EPI CELLS URNS QL MICRO: ABNORMAL /HPF
PH UR STRIP.AUTO: 6.5 [PH]
PROT UR STRIP-MCNC: ABNORMAL MG/DL
RBC #/AREA URNS AUTO: ABNORMAL /HPF
SL AMB  POCT GLUCOSE, UA: NEGATIVE
SL AMB LEUKOCYTE ESTERASE,UA: NEGATIVE
SL AMB POCT BILIRUBIN,UA: NEGATIVE
SL AMB POCT BLOOD,UA: NORMAL
SL AMB POCT CLARITY,UA: CLEAR
SL AMB POCT COLOR,UA: YELLOW
SL AMB POCT KETONES,UA: NEGATIVE
SL AMB POCT NITRITE,UA: NEGATIVE
SL AMB POCT PH,UA: 6.5
SL AMB POCT SPECIFIC GRAVITY,UA: 1.02
SL AMB POCT URINE PROTEIN: NEGATIVE
SL AMB POCT UROBILINOGEN: NORMAL
SP GR UR STRIP.AUTO: 1.02 (ref 1–1.03)
UROBILINOGEN UR STRIP-ACNC: <2 MG/DL
WBC #/AREA URNS AUTO: ABNORMAL /HPF

## 2022-07-20 PROCEDURE — 81003 URINALYSIS AUTO W/O SCOPE: CPT | Performed by: NURSE PRACTITIONER

## 2022-07-20 PROCEDURE — 99214 OFFICE O/P EST MOD 30 MIN: CPT | Performed by: NURSE PRACTITIONER

## 2022-07-20 PROCEDURE — 81001 URINALYSIS AUTO W/SCOPE: CPT | Performed by: NURSE PRACTITIONER

## 2022-07-20 RX ORDER — LISINOPRIL 20 MG/1
20 TABLET ORAL DAILY
Qty: 90 TABLET | Refills: 1 | Status: SHIPPED | OUTPATIENT
Start: 2022-07-20 | End: 2023-01-16

## 2022-07-20 RX ORDER — BUPROPION HYDROCHLORIDE 150 MG/1
150 TABLET ORAL EVERY MORNING
Qty: 90 TABLET | Refills: 1 | Status: SHIPPED | OUTPATIENT
Start: 2022-07-20

## 2022-07-20 RX ORDER — FLUOXETINE HYDROCHLORIDE 40 MG/1
40 CAPSULE ORAL DAILY
Qty: 90 CAPSULE | Refills: 1 | Status: SHIPPED | OUTPATIENT
Start: 2022-07-20 | End: 2023-01-16

## 2022-07-20 NOTE — ASSESSMENT & PLAN NOTE
- pt continues with asymptomatic microscopic hematuria  Moderate on urine dip in office today    - US kidney and bladder was normal  - referral given to follow up with urology for further evaluation PT ACUTE  Treatment Session          Pt seen on 9ST nursing unit.                                                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 Issued written HEP in standing with demonstration and informed use of counter for balance.        Other Therapeutic Intervention: Patient educated in daily POC. Extra time waiting for patient to use restroom (09/03/19 1021)     EDUCATION:   On this date, the patient was educated on HEP standing.    The response to education was: Demonstrates understanding    PT Identified Barriers to Discharge: none from PT standpoint     PLAN:   Continue skilled PT, including the following Treatment/Interventions: Functional transfer training;Strengthening;Bed mobility;Gait training;Stairs retraining (09/04/19 0845)    Frequency Comments: Wednesday (1-2 more sessions) (09/04/19 0845)    Treatment Plan for Next Session: assess flat bed mobility          RECOMMENDATIONS FOR DISCHARGE:  Recommendation for Discharge: PT: Home (09/04/19 0845)    PT/OT Mobility Equipment for Discharge: no needs anticipated (09/03/19 1021)  PT/OT ADL Equipment for Discharge: no additional needs at this time; has shower chair (09/03/19 1021)     Assistance needed when returning home:   Not discussed at this time due to discharge plan/needs not established.  Will continue to address as hospital stay progresses.       ICU Mobility Assesment (PERME):       Last 24 hours of Functional Data  Bed Mobility        Transfers  Transfers  Sit to Stand: Modified Independent (09/04/19 0845)  Stand to Sit: Modified Independent (09/04/19 0845)  Stand Pivot Transfers: Supervision (Supv) (09/03/19 1021)  Toilet Transfers: Modified Independent (09/03/19 1021)  Assistive Device/: 1 Person (09/04/19 0845)  Transfer Comments 1: Supervision for line management, Modified independence for increased time to compete with use of BUEs to stabilize (09/03/19 1021)      Gait  Gait  Gait Assistance: Supervision (Supv) (09/04/19 0845)  Assistive Device/: 1 Person (09/04/19 0845)  Ambulation Distance (Feet): 450 Feet (09/04/19 0845)  Pattern: Foot flat R;Foot flat L (09/03/19 1021)  Ambulation Surface: Tile;Carpet (09/03/19 1021)  Gait Comments 1: chest tube management only use of cart this writer pushed  (09/04/19 0845),      Stairs  Stairs Mobility  Number of Stairs: 12 (09/03/19 1021)  Stair Management Assistance: Supervision (Supv) (09/03/19 1021)  Stair Management Technique: One rail R;With gait belt;Forwards;Alternating pattern (09/03/19 1021)  Stairs Mobility Comments: Supervision for line management (09/03/19 1021)       Neuromuscular Re-education       Balance  Balance  Sitting - Static: Independent (09/03/19 1021)  Sitting - Dynamic: Independent  (09/03/19 1021)  Standing - Static: Modified Independent (09/03/19 1021)  Standing - Dynamic (eyes open): Modified Independent (09/03/19 1021)  Balance Comments #1: no LOB or unsteadiness noted (09/03/19 1021)    Wheelchair Mobility       Patient's Personal Goal: return home (09/03/19 1021)    Therapy Goals:    Goals  Short Term Goals to Be Reviewed On: 09/05/19 (09/03/19 1021)  Short Term Goals = Discharge Goals: Yes (09/03/19 1021)  Goal Agreement: Patient agrees with goals and treatment plan (09/03/19 1021)  Bed Mobility Discharge Goal: pt will perform bed mobility with HOB flat at [facility] (09/03/19 1021)  Bed Mobility Discharge Goal Progress: (not assessed on this date) (09/03/19 1021)  Transfer Discharge Goal: pt will perform sit <> stand transfers at [facility] (09/03/19 1021)  Transfer Discharge Goal Progress: Outcome met, complete goal (09/03/19 1021)  Ambulation Discharge Goal: pt will ambulate at least 300' at [facility] (09/03/19 1021)  Ambulation Discharge Goal Progress: Outcome not met, continue to monitor (09/03/19 1021)  Stairs Discharge Goal: pt will ascend/descend 12 steps with single rail at [facility] (09/03/19 1021)  Stairs Discharge Goal Progress: Outcome not met, continue to monitor (09/03/19 1021)  Therapeutic Exercise Discharge Goal: Patient will be independent in standing BLE strengthening HEP (09/03/19 1021)  Therapeutic Exercise Discharge Goal Progress: Outcome not met, continue to monitor (09/03/19 1021)        PT Time Spent: 40 minutes (09/04/19 0845)    See PT flowsheet for full details regarding the PT therapy provided.

## 2022-07-20 NOTE — PROGRESS NOTES
Assessment/Plan:    Problem List Items Addressed This Visit        Respiratory    Observed sleep apnea     - sleep study scheduled 9/11            Cardiovascular and Mediastinum    Benign essential HTN     - controlled on lisinopril 20mg daily          Relevant Medications    lisinopril (ZESTRIL) 20 mg tablet       Other    Anxiety     - controlled on prozac 40mg daily          Relevant Medications    FLUoxetine (PROzac) 40 MG capsule    Dysthymia     - continue prozac 40mg and reduce wellbutrin XL to 150mg daily          Relevant Medications    buPROPion (Wellbutrin XL) 150 mg 24 hr tablet    FLUoxetine (PROzac) 40 MG capsule    Hematuria - Primary     - pt continues with asymptomatic microscopic hematuria  Moderate on urine dip in office today  -  kidney and bladder was normal  - referral given to follow up with urology for further evaluation          Relevant Orders    UA w Reflex to Microscopic w Reflex to Culture - [address] [place] [address]    Ambulatory Referral to Urology    POCT urine dip auto non-scope      Other Visit Diagnoses     Essential hypertension, benign        Relevant Medications    lisinopril (ZESTRIL) 20 mg tablet          M*Modal software was used to dictate this note  It may contain errors with dictating incorrect words or incorrect spelling  Please contact the provider directly with any questions  Subjective:      Patient ID: Charles Myrick is a 31 y o  male  HPI    Patient presents today for 1 month follow up  He is scheduled Sept 11 for his sleep study  Anxiety/depression - he is currently on prozac 40mg daily and Wellbutrin XL 300mg which was increased at the last visit  He feels his anxiety is well controlled, but felt his depression was better controlled on a lower dose of Wellbutrin  He felt on the lower dose he was able to "push past" negative events or thoughts more easier  He was able to keep himself in a happier state of mind  He denies any SI or HI   He is still motivated to do things  HTN - well controlled on lisinopril 20mg daily     Hematuria - recent episodes of hematuria incidentally noted on UA  Asymptomatic  US kidney and bladder was normal      The following portions of the patient's history were reviewed and updated as appropriate: allergies, current medications, past family history, past medical history, past social history, past surgical history and problem list     Review of Systems   Constitutional: Negative for fever  Genitourinary: Negative for difficulty urinating, dysuria, flank pain, frequency, hematuria and urgency  Psychiatric/Behavioral: Positive for dysphoric mood (controlled)  Negative for self-injury and suicidal ideas  The patient is nervous/anxious (controlled)  Past Medical History:   Diagnosis Date    Anxiety 8/17/2016    Benign essential hypertension 7/18/2016    Eustachian tube disorder     Fibromatosis, plantar     Hypertension     Morbid obesity with BMI of 45 0-49 9, [facility] ) 3/27/2017         Current Outpatient Medications:     buPROPion (Wellbutrin XL) 150 mg 24 hr tablet, Take 1 tablet (150 mg total) by mouth every morning, Disp: 90 tablet, Rfl: 1    FLUoxetine (PROzac) 40 MG capsule, Take 1 capsule (40 mg total) by mouth daily, Disp: 90 capsule, Rfl: 1    lisinopril (ZESTRIL) 20 mg tablet, Take 1 tablet (20 mg total) by mouth daily, Disp: 90 tablet, Rfl: 1    No Known Allergies    Social History   Past Surgical History:   Procedure Laterality Date    APPENDECTOMY      BONE GRAFT      Bone replacement graft     Family History   Problem Relation Age of Onset    No Known Problems Mother     No Known Problems Father        Objective:  /72 (BP Location: Left arm, Patient Position: Sitting, Cuff Size: Large)   Pulse 68   Temp 97 7 °F (36 5 °C) (Temporal)   Ht 6' 2 41" (1 89 m)   Wt (!) 158 kg (347 lb 9 6 oz)   SpO2 98%   BMI 44 14 kg/m²      Physical Exam  Vitals reviewed     Constitutional:       General: He is not in acute distress  Appearance: Normal appearance  He is obese  He is not diaphoretic  HENT:      Head: Normocephalic and atraumatic  Eyes:      Extraocular Movements: Extraocular movements intact  Conjunctiva/sclera: Conjunctivae normal       Pupils: Pupils are equal, round, and reactive to light  Cardiovascular:      Rate and Rhythm: Normal rate and regular rhythm  Heart sounds: Normal heart sounds  No murmur heard  Pulmonary:      Effort: Pulmonary effort is normal  No respiratory distress  Breath sounds: Normal breath sounds  No wheezing, rhonchi or rales  Abdominal:      Tenderness: There is no right CVA tenderness or left CVA tenderness  Neurological:      Mental Status: He is alert and oriented to person, place, and time  Mental status is at baseline     Psychiatric:         Mood and Affect: Mood normal          Behavior: Behavior normal

## 2022-07-29 ENCOUNTER — TELEPHONE (OUTPATIENT)
Dept: INTERNAL MEDICINE CLINIC | Facility: CLINIC | Age: 37
End: 2022-07-29

## 2022-07-29 DIAGNOSIS — R31.0 GROSS HEMATURIA: Primary | ICD-10-CM

## 2022-07-29 DIAGNOSIS — R30.0 DYSURIA: ICD-10-CM

## 2022-07-29 NOTE — TELEPHONE ENCOUNTER
Pt had traces of blood found in urine and has an appt set up with Urology on 8/29  Pt states before he did not have any pain, but now experiencing some pain  Wanted to see if there is anything provider recommends doing prior to Urology appt

## 2022-07-29 NOTE — TELEPHONE ENCOUNTER
I put in an order for a urine test to make sure he doesn't have an infection  Please have him go to the lab for the test      The other concern would be a kidney stone  Where is his pain?  I could see him for a virtual visit today

## 2022-09-08 ENCOUNTER — PATIENT MESSAGE (OUTPATIENT)
Dept: INTERNAL MEDICINE CLINIC | Facility: CLINIC | Age: 37
End: 2022-09-08

## 2022-09-12 ENCOUNTER — OFFICE VISIT (OUTPATIENT)
Dept: UROLOGY | Facility: AMBULATORY SURGERY CENTER | Age: 37
End: 2022-09-12
Payer: COMMERCIAL

## 2022-09-12 VITALS
BODY MASS INDEX: 40.43 KG/M2 | HEIGHT: 74 IN | OXYGEN SATURATION: 98 % | HEART RATE: 69 BPM | DIASTOLIC BLOOD PRESSURE: 72 MMHG | SYSTOLIC BLOOD PRESSURE: 124 MMHG | WEIGHT: 315 LBS

## 2022-09-12 DIAGNOSIS — R31.21 ASYMPTOMATIC MICROSCOPIC HEMATURIA: ICD-10-CM

## 2022-09-12 LAB
BACTERIA UR QL AUTO: ABNORMAL /HPF
BILIRUB UR QL STRIP: NEGATIVE
CLARITY UR: CLEAR
COLOR UR: ABNORMAL
GLUCOSE UR STRIP-MCNC: NEGATIVE MG/DL
HGB UR QL STRIP.AUTO: ABNORMAL
KETONES UR STRIP-MCNC: NEGATIVE MG/DL
LEUKOCYTE ESTERASE UR QL STRIP: NEGATIVE
MUCOUS THREADS UR QL AUTO: ABNORMAL
NITRITE UR QL STRIP: NEGATIVE
NON-SQ EPI CELLS URNS QL MICRO: ABNORMAL /HPF
PH UR STRIP.AUTO: 5.5 [PH]
PROT UR STRIP-MCNC: NEGATIVE MG/DL
RBC #/AREA URNS AUTO: ABNORMAL /HPF
SP GR UR STRIP.AUTO: 1.02 (ref 1–1.03)
UROBILINOGEN UR STRIP-ACNC: <2 MG/DL
WBC #/AREA URNS AUTO: ABNORMAL /HPF

## 2022-09-12 PROCEDURE — 81001 URINALYSIS AUTO W/SCOPE: CPT | Performed by: NURSE PRACTITIONER

## 2022-09-12 PROCEDURE — 99244 OFF/OP CNSLTJ NEW/EST MOD 40: CPT | Performed by: NURSE PRACTITIONER

## 2022-09-12 PROCEDURE — 87086 URINE CULTURE/COLONY COUNT: CPT | Performed by: NURSE PRACTITIONER

## 2022-09-12 NOTE — PATIENT INSTRUCTIONS
Cystoscopy   AMBULATORY CARE:   A cystoscopy  is a procedure to look inside your urethra and bladder using a cystoscope  A cystoscope is a small tube with a light and magnifying camera on the end  The procedure is used to diagnose and treat conditions of the bladder, urethra, or prostate  Your healthcare provider may also do a ureteroscopy during a cystoscopy  A ureteroscopy is a procedure to look inside your ureters and kidneys  Prepare for your cystoscopy: Your healthcare provider will talk to you about how to prepare for your procedure  He or she will tell you what medicines to take and not to take on the day of your procedure  You may need to stop taking medicines such as anticoagulants, aspirin, and ibuprofen several days before your procedure  Your provider may tell you stop eating after midnight the night before your procedure  You may be asked to drink a large amount of liquids before your procedure  Arrange for a ride home after your procedure  You will not be allowed to drive yourself home  During your cystoscopy: You may be given general anesthesia to keep you asleep and pain free during your procedure  Your healthcare provider may instead use local anesthesia  It is put into your urethra and bladder  You will not feel pain, but you may be able to feel some pressure during your procedure  With local anesthesia, you may feel burning or need to urinate when the cystoscope is put in and removed  If you are female, you will be placed on your back and your feet may be placed in stirrups  If you are male, you will be placed on your back or in a sitting position  The cystoscope will be placed through your urethra and into your bladder  The urologist will look at the walls of your urethra as the scope goes through to your bladder  Your bladder will be filled with liquid so your urologist can see the inside of your bladder more clearly   Tools may be inserted through the cystoscope to treat any problems in your urethra or bladder  Your provider may also take a sample of tissue and send it to a lab for tests  After your cystoscopy:  After you are fully awake, you will go home  You may see small amounts of blood in your urine  This is normal  It is also normal to have an increased need to urinate  You may also have burning or mild discomfort in your bladder or kidney area when you urinate  If you had general anesthesia, it may take at least 24 hours before you feel like your usual self  Risks of a cystoscopy: You may bleed more than expected or develop an infection  Your urethra, bladder, or ureters may get damaged during your procedure  You may have abdominal pain  Swelling caused by the cystoscopy may cause a blockage or slow urine flow  Seek care immediately if:   Your urine turns from pink to red, or you have clots in your urine  You cannot urinate and your bladder feels full  You have severe pain  Contact your healthcare provider or urologist if:   Your pain or burning during urination becomes worse or lasts longer than 1 day  Your urine stays pink for longer than 1 day  You have a fever and chills  You urinate less than usual, or still feel like you have to urinate after you use the bathroom  You have questions or concerns about your condition or care  Medicines: You may  be given any of the following:  Antibiotics  help treat or prevent a bacterial infection  Acetaminophen  decreases pain and fever  It is available without a doctor's order  Ask how much to take and how often to take it  Follow directions  Read the labels of all other medicines you are using to see if they also contain acetaminophen, or ask your doctor or pharmacist  Acetaminophen can cause liver damage if not taken correctly  Do not use more than 4 grams (4,000 milligrams) total of acetaminophen in one day  Take your medicine as directed    Contact your healthcare provider if you think your medicine is not helping or if you have side effects  Tell him or her if you are allergic to any medicine  Keep a list of the medicines, vitamins, and herbs you take  Include the amounts, and when and why you take them  Bring the list or the pill bottles to follow-up visits  Carry your medicine list with you in case of an emergency  Self-care:   Drink liquids as directed  Your healthcare provider may recommend that you drink 6 to 8 eight-ounce cups of water every day for 2 days after your procedure  Apply a warm, damp washcloth over your urethral opening  This may help to relieve discomfort  Ask when you can return to regular daily activities  Your healthcare provider may recommend that you rest after your procedure  Do not have sex  until your healthcare provider tells you it is okay  Sex may increase your risk for a urinary tract infection  Follow up with your healthcare provider as directed:  Write down your questions so you remember to ask them during your visits  © Copyright [employer] 2022 Information is for End User's use only and may not be sold, redistributed or otherwise used for commercial purposes  All illustrations and images included in CareNotes® are the copyrighted property of [employer]  or [employer]   The above information is an  only  It is not intended as medical advice for individual conditions or treatments  Talk to your doctor, nurse or pharmacist before following any medical regimen to see if it is safe and effective for you

## 2022-09-12 NOTE — PROGRESS NOTES
9/12/2022    Navjot Bush  1985  [id number]      Assessment  -Microscopic hematuria    Discussion/Plan  Dianelys Domínguez is a 31 y o  male who presents in consultation     1  Microscopic hematuria- urine dip in the office today identified large presence of blood  Due to his recurrent episodes of microscopic hematuria as defined in AUA guidelines, we discussed proceeding with flexible cystoscopy for further evaluation  Renal ultrasound performed in June 2022 was unremarkable  Informed consent was provided including risks and benefits  He is amenable with plan  Follow-up with MD for flexible cystoscopy  He was advised to call sooner with any episodes of gross hematuria     -All questions answered, patient agrees with plan      History of Present Illness  31 y o  male who presents in consultation today for evaluation of microscopic hematuria  Patient referred by his PCP  Urinalysis performed in May 2022 for annual physical examination noted 4-10 RBC on hpf  This prompted repeat urinalysis performed in June and July which again noted 2-4 RBC/hpf  He denies any episodes of gross hematuria  Patient denies any additional lower urinary tract symptoms or dysuria  He does know occasional pressure in left groin, behind testicle area over the last 1 year  Patient states discomfort is relieved after ejaculation  He describes as an intermittent, dull ache  He states smoking an occasional cigar 4 times per year, but denies any smoking history  He was exposed to secondhand smoke growing up  Patient denies any strong family history of bladder or kidney malignancy  Review of Systems  Review of Systems   Constitutional: Negative  HENT: Negative  Respiratory: Negative  Cardiovascular: Negative  Gastrointestinal: Negative  Genitourinary: Negative for decreased urine volume, difficulty urinating, dysuria, flank pain, frequency, hematuria and urgency  Musculoskeletal: Negative  Skin: Negative  Neurological: Negative  Psychiatric/Behavioral: Negative          Past Medical History  Past Medical History:   Diagnosis Date    Anxiety 8/17/2016    Benign essential hypertension 7/18/2016    Eustachian tube disorder     Fibromatosis, plantar     Hypertension     Morbid obesity with BMI of 45 0-49 9, adult ([facility] [address] ) 3/27/2017       Past Social History  Past Surgical History:   Procedure Laterality Date    APPENDECTOMY      BONE GRAFT      Bone replacement graft       Past Family History  Family History   Problem Relation Age of Onset    No Known Problems Mother     No Known Problems Father        Past Social history  Social History     Socioeconomic History    Marital status: /Civil Union     Spouse name: Not on file    Number of children: Not on file    Years of education: Not on file    Highest education level: Not on file   Occupational History     Employer: [employer]   Tobacco Use    Smoking status: Light Tobacco Smoker     Types: Cigars    Smokeless tobacco: Never Used    Tobacco comment: 4 times a year   Vaping Use    Vaping Use: Never used   Substance and Sexual Activity    Alcohol use: Yes     Comment: Social    Drug use: No    Sexual activity: Not Currently   Other Topics Concern    Not on file   Social History Narrative    Not on file     Social Determinants of Health     Financial Resource Strain: Not on file   Food Insecurity: Not on file   Transportation Needs: Not on file   Physical Activity: Not on file   Stress: Not on file   Social Connections: Not on file   Intimate Partner Violence: Not on file   Housing Stability: Not on file       Current Medications  Current Outpatient Medications   Medication Sig Dispense Refill    buPROPion (Wellbutrin XL) 150 mg 24 hr tablet Take 1 tablet (150 mg total) by mouth every morning 90 tablet 1    FLUoxetine (PROzac) 40 MG capsule Take 1 capsule (40 mg total) by mouth daily 90 capsule 1    lisinopril (ZESTRIL) 20 mg tablet Take 1 tablet (20 mg total) by mouth daily 90 tablet 1     No current facility-administered medications for this visit  Allergies  No Known Allergies    Past Medical History, Social History, Family History, medications and allergies were reviewed  Vitals  Vitals:    09/12/22 0901   BP: 124/72   Pulse: 69   SpO2: 98%   Weight: (!) 155 kg (342 lb)   Height: 6' 2" (1 88 m)       Physical Exam  Physical Exam  Constitutional:       Appearance: Normal appearance  He is well-developed  HENT:      Head: Normocephalic  Eyes:      Pupils: Pupils are equal, round, and reactive to light  Pulmonary:      Effort: Pulmonary effort is normal    Abdominal:      Palpations: Abdomen is soft  Tenderness: There is no right CVA tenderness or left CVA tenderness  Genitourinary:     Penis: Normal        Testes: Normal       Prostate: Normal       Rectum: Normal    Musculoskeletal:         General: Normal range of motion  Cervical back: Normal range of motion  Skin:     General: Skin is warm and dry  Neurological:      General: No focal deficit present  Mental Status: He is alert and oriented to person, place, and time  Psychiatric:         Mood and Affect: Mood normal          Behavior: Behavior normal          Thought Content: Thought content normal          Judgment: Judgment normal          Results    I have personally reviewed all pertinent lab results and reviewed with patient  No results found for: PSA  Lab Results   Component Value Date    CALCIUM 9 1 05/09/2022    K 4 1 05/09/2022    CO2 30 05/09/2022     05/09/2022    BUN 16 05/09/2022    CREATININE 0 81 05/09/2022     Lab Results   Component Value Date    WBC 8 48 05/09/2022    HGB 14 2 05/09/2022    HCT 44 9 05/09/2022    MCV 91 05/09/2022     05/09/2022     No results found for this or any previous visit (from the past 1 hour(s))

## 2022-09-13 LAB — BACTERIA UR CULT: NORMAL

## 2022-09-14 ENCOUNTER — TELEPHONE (OUTPATIENT)
Dept: INTERNAL MEDICINE CLINIC | Facility: CLINIC | Age: 37
End: 2022-09-14

## 2022-09-14 DIAGNOSIS — G47.30 OBSERVED SLEEP APNEA: Primary | ICD-10-CM

## 2022-09-14 DIAGNOSIS — I10 BENIGN ESSENTIAL HTN: ICD-10-CM

## 2022-09-14 DIAGNOSIS — E66.01 MORBID OBESITY WITH BMI OF 45.0-49.9, ADULT (HCC): ICD-10-CM

## 2022-09-14 NOTE — TELEPHONE ENCOUNTER
Patient was turned down for a sleep study at [facility]    Could orders be put in for an at [address] [place] [address]

## 2022-10-06 ENCOUNTER — TELEPHONE (OUTPATIENT)
Dept: SLEEP CENTER | Facility: CLINIC | Age: 37
End: 2022-10-06

## 2022-10-06 NOTE — TELEPHONE ENCOUNTER
----- Message from Mikie Zeng DO sent at 10/5/2022  3:07 PM EDT -----  approved  ----- Message -----  From: Sarah Griffiths  Sent: 5/43/3569   9:11 AM EDT  To: Sleep Medicine [facility] Provider    This home  sleep study needs approval      If approved please sign and return to clerical pool  If denied please include reasons why  Also provide alternative testing if warranted  Please sign and return to clerical pool

## 2022-10-24 ENCOUNTER — PROCEDURE VISIT (OUTPATIENT)
Dept: UROLOGY | Facility: AMBULATORY SURGERY CENTER | Age: 37
End: 2022-10-24
Payer: COMMERCIAL

## 2022-10-24 VITALS
RESPIRATION RATE: 18 BRPM | HEART RATE: 78 BPM | BODY MASS INDEX: 40.43 KG/M2 | HEIGHT: 74 IN | WEIGHT: 315 LBS | OXYGEN SATURATION: 98 % | DIASTOLIC BLOOD PRESSURE: 82 MMHG | SYSTOLIC BLOOD PRESSURE: 132 MMHG

## 2022-10-24 DIAGNOSIS — R55 VASOVAGAL REACTION: ICD-10-CM

## 2022-10-24 DIAGNOSIS — R31.21 ASYMPTOMATIC MICROSCOPIC HEMATURIA: Primary | ICD-10-CM

## 2022-10-24 LAB — POST-VOID RESIDUAL VOLUME, ML POC: 113 ML

## 2022-10-24 PROCEDURE — 51798 US URINE CAPACITY MEASURE: CPT | Performed by: UROLOGY

## 2022-10-24 PROCEDURE — 52000 CYSTOURETHROSCOPY: CPT | Performed by: UROLOGY

## 2022-10-24 NOTE — ASSESSMENT & PLAN NOTE
Patient felt lightheaded and nauseous immediately after cystoscopy    His symptoms improved after some time and with aid of a cold compress

## 2022-10-24 NOTE — PROGRESS NOTES
Assessment/Plan:    Hematuria  The patient has had a negative hematuria workup  Recommend annual urinalysis with PCP and consideration for repeat workup in 3-5 years if micro hematuria persists  If the patient has gross hematuria then they should see us immediately  Vasovagal reaction  Patient felt lightheaded and nauseous immediately after cystoscopy  His symptoms improved after some time and with aid of a cold compress          Subjective:      Patient ID: Rik Robles is a 30 y o  male  HPI  31 y o  male with microscopic hematuria  Patient referred by his PCP  Urinalysis performed in May 2022 for annual physical examination noted 4-10 RBC on hpf  This prompted repeat urinalysis performed in June and July which again noted 2-4 RBC/hpf  He denies any episodes of gross hematuria  He had unremarkable RBUS and cystoscopy today was also normal  He did have vasovagal sx after cysto  Patient denies any additional lower urinary tract symptoms or dysuria  Uroflow today only had voided volume of 70cc with qmax of 14ml/s and qAvg of 8ml/s and PVR of 113cc    He does have occasional pressure in left groin, behind testicle area over the last 1 year  Patient states discomfort is relieved after ejaculation  He describes as an intermittent, dull ache  He states smoking an occasional cigar 4 times per year, but denies any smoking history  He was exposed to secondhand smoke growing up  Patient denies any strong family history of bladder or kidney malignancy      Past Surgical History:   Procedure Laterality Date   • APPENDECTOMY     • BONE GRAFT      Bone replacement graft        Past Medical History:   Diagnosis Date   • Anxiety 8/17/2016   • Benign essential hypertension 7/18/2016   • Eustachian tube disorder    • Fibromatosis, plantar    • Hypertension    • Morbid obesity with BMI of 45 0-49 9, [facility] ) 3/27/2017        AUA SYMPTOM SCORE    Flowsheet Row Most Recent Value   AUA SYMPTOM SCORE How often have you had a sensation of not emptying your bladder completely after you finished urinating? 0 (P)     How often have you had to urinate again less than two hours after you finished urinating? 1 (P)     How often have you found you stopped and started again several times when you urinate? 0 (P)     How often have you found it difficult to postpone urination? 0 (P)     How often have you had a weak urinary stream? 0 (P)     How often have you had to push or strain to begin urination? 0 (P)     How many times did you most typically get up to urinate from the time you went to bed at night until the time you got up in the morning? 0 (P)     Quality of Life: If you were to spend the rest of your life with your urinary condition just the way it is now, how would you feel about that? 2 (P)     AUA SYMPTOM SCORE 1 (P)            Review of Systems   Constitutional: Negative for chills and fever  HENT: Negative for ear pain and sore throat  Eyes: Negative for pain and visual disturbance  Respiratory: Negative for cough and shortness of breath  Cardiovascular: Negative for chest pain and palpitations  Gastrointestinal: Negative for abdominal pain and vomiting  Genitourinary: Negative for dysuria and hematuria  Musculoskeletal: Negative for arthralgias and back pain  Skin: Negative for color change and rash  Neurological: Negative for seizures and syncope  All other systems reviewed and are negative  Objective:      /82 (BP Location: Right arm, Patient Position: Sitting, Cuff Size: Standard)   Pulse 78   Resp 18   Ht 6' 2" (1 88 m)   Wt (!) 155 kg (342 lb)   SpO2 98%   BMI 43 91 kg/m²     No results found for: PSA       Physical Exam  Vitals reviewed  Constitutional:       Appearance: Normal appearance  He is normal weight  HENT:      Head: Normocephalic and atraumatic  Eyes:      Pupils: Pupils are equal, round, and reactive to light     Abdominal:      General: Abdomen is flat  Neurological:      General: No focal deficit present  Mental Status: He is alert and oriented to person, place, and time  Psychiatric:         Mood and Affect: Mood normal          Thought Content: Thought content normal                 Cystoscopy     Date/Time 10/24/2022 9:04 AM     Performed by  Mary Ann Pierce MD     Authorized by Mary Ann Pierce MD      Universal Protocol:  Consent: Written consent obtained  Risks and benefits: risks, benefits and alternatives were discussed  Consent given by: patient  Time out: Immediately prior to procedure a "time out" was called to verify the correct patient, procedure, equipment, support staff and site/side marked as required  Patient understanding: patient states understanding of the procedure being performed  Patient consent: the patient's understanding of the procedure matches consent given  Procedure consent: procedure consent matches procedure scheduled  Patient identity confirmed: verbally with patient        Procedure Details:  Procedure type: cystoscopy    Patient tolerance: Patient tolerated the procedure well with no immediate complications    Additional Procedure Details: A time-out was performed identifying the correct patient site and procedure  A MA chaperone was in the room  A flexible cystoscope was introduced into the urethra  The pendulous urethra was normal   The prostatic urethra showed minimal bilateral lobar hypertrophy without a median lobe  The bladder did not have any lesions concerning for malignancy  There were no trabeculations and no diverticula  The ureteral orifices were in orthotopic position  INDICATION:   R31 9: Hematuria, unspecified      COMPARISON: None     TECHNIQUE:   Ultrasound of the retroperitoneum was performed with a curvilinear transducer utilizing volumetric sweeps and still imaging techniques       FINDINGS:     KIDNEYS:  Symmetric and normal size  Right kidney:  13 1 x 6 0 x 5 7 cm   Volume 238 8 mL  Left kidney:  13 2 x 6 5 x 6 2 cm  Volume 280 4 mL     Right kidney  Normal echogenicity and contour  No mass is identified  No hydronephrosis  No shadowing calculi  No perinephric fluid collections      Left kidney  Normal echogenicity and contour  No mass is identified  No hydronephrosis  No shadowing calculi  No perinephric fluid collections      URETERS:  Nonvisualized      BLADDER:   Normally distended  No focal thickening or mass lesions  Bilateral ureteral jets detected      Orders  Orders Placed This Encounter   Procedures   • Cystoscopy     This order was created via procedure documentation   • POCT Measure PVR

## 2022-12-08 RX ORDER — LISINOPRIL 20 MG/1
20 TABLET ORAL DAILY
Qty: 90 TABLET | Refills: 0 | OUTPATIENT
Start: 2022-12-08 | End: 2023-06-06

## 2022-12-08 RX ORDER — FLUOXETINE HYDROCHLORIDE 40 MG/1
40 CAPSULE ORAL DAILY
Qty: 90 CAPSULE | Refills: 0 | OUTPATIENT
Start: 2022-12-08 | End: 2023-06-06

## 2023-01-20 ENCOUNTER — HOSPITAL ENCOUNTER (OUTPATIENT)
Dept: SLEEP CENTER | Facility: CLINIC | Age: 38
Discharge: HOME | End: 2023-01-20

## 2023-01-20 DIAGNOSIS — I10 BENIGN ESSENTIAL HTN: ICD-10-CM

## 2023-01-20 DIAGNOSIS — G47.30 OBSERVED SLEEP APNEA: ICD-10-CM

## 2023-01-20 DIAGNOSIS — E66.01 MORBID OBESITY WITH BMI OF 45.0-49.9, ADULT (HCC): ICD-10-CM

## 2023-01-21 NOTE — PROGRESS NOTES
Home Sleep Study Documentation    HOME STUDY DEVICE: Noxturnal yes                                           Ana Paula G3 no      Pre-Sleep Home Study:    Set-up and instructions performed by: BECK Dao    Technician performed demonstration for Patient: yes    Return demonstration performed by Patient: yes    Written instructions provided to Patient: yes    Patient signed consent form: yes        Post-Sleep Home Study:    Additional comments by Patient: pending    Home Sleep Study Failed:pending    Failure reason: pending    Reported or Detected: pending    Scored by: pending

## 2023-01-25 ENCOUNTER — TELEPHONE (OUTPATIENT)
Dept: INTERNAL MEDICINE CLINIC | Facility: OTHER | Age: 38
End: 2023-01-25

## 2023-01-26 DIAGNOSIS — G47.33 SEVERE OBSTRUCTIVE SLEEP APNEA: Primary | ICD-10-CM

## 2023-01-26 NOTE — PROGRESS NOTES
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

## 2023-01-26 NOTE — TELEPHONE ENCOUNTER
Please let him know his study showed severe obstructive sleep apnea  The next step is for him to schedule the CPAP titration study where they will determine his CPAP pressure settings  I placed the order  Please give him the number to call and schedule  He will then follow up with sleep medicine from there   Referral is placed

## 2023-02-07 NOTE — TELEPHONE ENCOUNTER
Patient attempted to schedule sleep study however, they are booking a couple months out, he would like to know if theres is any location that you know of that have sooner appointments available

## 2023-02-20 ENCOUNTER — TELEPHONE (OUTPATIENT)
Dept: SLEEP CENTER | Facility: CLINIC | Age: 38
End: 2023-02-20

## 2023-02-20 NOTE — TELEPHONE ENCOUNTER
----- Message from Julian Lopez DO sent at 2/19/2023  9:45 PM EST -----  approved  ----- Message -----  From: Russ García  Sent: 2/9/2023   8:06 AM EST  To: Sleep Medicine [facility] Provider    This CPAP sleep study needs approval      If approved please sign and return to clerical pool  If denied please include reasons why  Also provide alternative testing if warranted  Please sign and return to clerical pool

## 2023-03-22 ENCOUNTER — HOSPITAL ENCOUNTER (OUTPATIENT)
Dept: SLEEP CENTER | Facility: CLINIC | Age: 38
Discharge: HOME | End: 2023-03-22

## 2023-03-22 DIAGNOSIS — G47.33 SEVERE OBSTRUCTIVE SLEEP APNEA: ICD-10-CM

## 2023-03-23 PROBLEM — G47.33 SEVERE OBSTRUCTIVE SLEEP APNEA: Status: ACTIVE | Noted: 2018-10-22

## 2023-03-23 NOTE — PROGRESS NOTES
Sleep Study Documentation    Pre-Sleep Study       Sleep testing procedure explained to patient:YES    Patient napped prior to study:NO    Caffeine:Dayshift worker after 12PM   Caffeine use:YES- energy drinks  1 serving and ice tea  12 to 26 ounces    Alcohol:Dayshift workers after 5PM: Alcohol use:NO    Typical day for patient:YES       Study Documentation    Sleep Study Indications: JM    Sleep Study: Treatment   Optimal PAP pressure: 11  Leak:None  Snore:Eliminated  REM Obtained:yes  Supplemental O2: no    Minimum SaO2 78  Baseline SaO2 95  PAP mask tried (list all)N30  PAP mask choice (final)N30  PAP mask type:nasal  PAP pressure at which snoring was eliminated 11  Minimum SaO2 at final PAP pressure 84  Mode of Therapy:CPAP  ETCO2:No  CPAP changed to BiPAP:No    Mode of Therapy:CPAP    EKG abnormalities: no     EEG abnormalities: no    Sleep Study Recorded < 2 hours: N/A    Sleep Study Recorded > 2 hours but incomplete study: N/A    Sleep Study Recorded 6 hours but no sleep obtained: NO    Patient classification: employed       Post-Sleep Study    Medication used at bedtime or during sleep study:YES other prescription medications    Patient reports time it took to fall asleep:20 to 30 minutes    Patient reports waking up during study:3 or more times  Patient reports returning to sleep in 10 to 30 minutes  Patient reports sleeping 4 to 6 hours with dreaming  Patient reports sleep during study:typical    Patient rated sleepiness: Somewhat sleepy or tired    PAP treatment:yes: Post PAP treatment patient reports feeling unsure if a change is noted and  would wear PAP mask at home

## 2023-03-28 ENCOUNTER — TELEPHONE (OUTPATIENT)
Dept: SLEEP CENTER | Facility: CLINIC | Age: 38
End: 2023-03-28

## 2023-03-28 NOTE — TELEPHONE ENCOUNTER
Patient with severe JM completed CPAP study and Dr Adolfo Lizama recommends treatment with APAP  Advised patient of results    Patient scheduled for a consult with Dr Adolfo Lizama

## 2023-03-30 DIAGNOSIS — I10 ESSENTIAL HYPERTENSION, BENIGN: ICD-10-CM

## 2023-03-30 DIAGNOSIS — F41.9 ANXIETY: ICD-10-CM

## 2023-03-30 RX ORDER — FLUOXETINE HYDROCHLORIDE 40 MG/1
40 CAPSULE ORAL DAILY
Qty: 30 CAPSULE | Refills: 0 | Status: SHIPPED | OUTPATIENT
Start: 2023-03-30 | End: 2023-09-26

## 2023-03-30 RX ORDER — LISINOPRIL 20 MG/1
20 TABLET ORAL DAILY
Qty: 30 TABLET | Refills: 0 | Status: SHIPPED | OUTPATIENT
Start: 2023-03-30 | End: 2023-09-26

## 2023-05-16 DIAGNOSIS — F41.9 ANXIETY: ICD-10-CM

## 2023-05-16 DIAGNOSIS — I10 ESSENTIAL HYPERTENSION, BENIGN: ICD-10-CM

## 2023-05-16 NOTE — TELEPHONE ENCOUNTER
Left a voicemail at [phone] to inform them that the refill request for Lisinopril and Prozac come from his PCP  Office phone number left on voicemail and this message is being sent to primary office to address prescription needs  Left a voicemail with patient as well to inform him

## 2023-05-17 RX ORDER — FLUOXETINE HYDROCHLORIDE 40 MG/1
40 CAPSULE ORAL DAILY
Qty: 90 CAPSULE | Refills: 0 | Status: SHIPPED | OUTPATIENT
Start: 2023-05-17 | End: 2023-05-18 | Stop reason: SDUPTHER

## 2023-05-17 RX ORDER — LISINOPRIL 20 MG/1
20 TABLET ORAL DAILY
Qty: 90 TABLET | Refills: 0 | Status: SHIPPED | OUTPATIENT
Start: 2023-05-17 | End: 2023-05-18 | Stop reason: SDUPTHER

## 2023-05-17 NOTE — TELEPHONE ENCOUNTER
Patient would like his medications sent to [facility] instead  Are we able to fix that for him?

## 2023-05-17 NOTE — TELEPHONE ENCOUNTER
Patient scheduled follow up for 6/28/23 and is asking if he can get his scripts for a 90 day supply  Detail Level: Zone

## 2023-05-17 NOTE — TELEPHONE ENCOUNTER
Left message for patient to call back to schedule next appointment  Once scheduled we can send refills if needed by patient

## 2023-05-18 RX ORDER — LISINOPRIL 20 MG/1
20 TABLET ORAL DAILY
Qty: 90 TABLET | Refills: 1 | Status: SHIPPED | OUTPATIENT
Start: 2023-05-18 | End: 2023-11-14

## 2023-05-18 RX ORDER — FLUOXETINE HYDROCHLORIDE 40 MG/1
40 CAPSULE ORAL DAILY
Qty: 90 CAPSULE | Refills: 1 | Status: SHIPPED | OUTPATIENT
Start: 2023-05-18 | End: 2023-11-14

## 2023-05-18 NOTE — TELEPHONE ENCOUNTER
Patient called again this am  And yet again, the prescription's were sent to the wrong pharmacy  Sent to [facility]

## 2023-07-24 ENCOUNTER — TELEPHONE (OUTPATIENT)
Dept: SLEEP CENTER | Facility: CLINIC | Age: 38
End: 2023-07-24

## 2023-07-26 ENCOUNTER — TELEPHONE (OUTPATIENT)
Dept: SLEEP CENTER | Facility: CLINIC | Age: 38
End: 2023-07-26

## 2023-07-26 ENCOUNTER — OFFICE VISIT (OUTPATIENT)
Dept: SLEEP CENTER | Facility: CLINIC | Age: 38
End: 2023-07-26
Payer: COMMERCIAL

## 2023-07-26 VITALS
DIASTOLIC BLOOD PRESSURE: 84 MMHG | WEIGHT: 315 LBS | BODY MASS INDEX: 40.43 KG/M2 | HEIGHT: 74 IN | HEART RATE: 78 BPM | SYSTOLIC BLOOD PRESSURE: 124 MMHG

## 2023-07-26 DIAGNOSIS — G47.33 SEVERE OBSTRUCTIVE SLEEP APNEA: Primary | ICD-10-CM

## 2023-07-26 DIAGNOSIS — R06.83 SNORING: ICD-10-CM

## 2023-07-26 DIAGNOSIS — I10 BENIGN ESSENTIAL HTN: ICD-10-CM

## 2023-07-26 DIAGNOSIS — G47.19 EXCESSIVE DAYTIME SLEEPINESS: ICD-10-CM

## 2023-07-26 DIAGNOSIS — E66.01 MORBID OBESITY WITH BMI OF 45.0-49.9, ADULT (HCC): ICD-10-CM

## 2023-07-26 PROCEDURE — 99204 OFFICE O/P NEW MOD 45 MIN: CPT | Performed by: INTERNAL MEDICINE

## 2023-07-26 NOTE — TELEPHONE ENCOUNTER
Rx for CPAP and clinical information sent to [facility] via [employer]. Per message from office staff, patient wants to self-pay for machine. Noted this on order in [employer]. Also sent email to [address] [place] [address] liaisons informing them of this.

## 2023-07-26 NOTE — PATIENT INSTRUCTIONS
Sleep Apnea   AMBULATORY CARE:   Sleep apnea  is a condition that causes you to stop breathing often during sleep. Types of sleep apnea:   Obstructive sleep apnea (JM)  is the most common kind. The muscles and tissues around your throat relax and block air from passing through. Obesity, use of alcohol or cigarettes, or a family history are common causes. JM may increase your risk for complications after surgery. Central sleep apnea (CSA)  means your brain does not send signals to the muscles that control breathing. You do not take a breath even though your airway is open. Common causes include medical conditions such as heart failure, being older than 40, or use of opioids. Complex (or mixed) sleep apnea  means you have both obstructive and central sleep apnea. Common signs and symptoms:   Loud snoring or long pauses in breathing    Feeling sleepy, slow, and tired during the day    Snorting, gasping, or choking while you sleep, and waking up suddenly because of these    Feeling irritable during the day    Dry mouth or a headache in the mornings    Heavy night sweating    A hard time thinking, remembering things, or focusing on your tasks the following day    Call your local emergency number (911 in the [address]) if:   You have chest pain or trouble breathing. Call your doctor if:   You have new or worsening signs or symptoms. You have questions or concerns about your condition or care. Treatment  depends on the kind of apnea you have. A mouth device  may be needed if you have mild sleep apnea. These are designed to keep your throat open. Ask your dentist or healthcare provider about the best mouth device for you. A machine  may be used to help you get more air during sleep. A mask may be placed over your nose and mouth, or just your nose. The mask is hooked to the machine. You will get air through the mask.     A continuous positive airway pressure (CPAP) machine  is used to keep your airway open during sleep. The machine blows a gentle stream of air into the mask when you breathe. This helps keep your airway open so you can breathe more regularly. Extra oxygen may be given through the machine. A bilevel positive airway pressure (BiPAP) machine  gives air but lowers the pressure when you breathe out. An adaptive servo-ventilator (ASV)  is a machine that learns your usual breathing pattern. Then, it uses pressure to give you air and prevent stops in your breathing. Surgery  to expand your airway or remove extra tissues may be needed. Surgery is usually only considered if other treatments do not work. Manage or prevent sleep apnea:   Reach and maintain a healthy weight. Ask your healthcare provider what a healthy weight is for you. Ask your provider to help you create a safe weight loss plan if you are overweight. Even a small goal of a 10% weight loss can improve your symptoms. Do not smoke. Nicotine and other chemicals in cigarettes and cigars can cause lung damage. Ask your healthcare provider for information if you currently smoke and need help to quit. E-cigarettes or smokeless tobacco still contain nicotine. Talk to your healthcare provider before you use these products. Do not drink alcohol or take sedative medicine before you go to sleep. Alcohol and sedatives can relax the muscles and tissues around your throat. This can block the airflow to your lungs. Sleep on your side or use pillows designed to prevent sleep apnea. This prevents your tongue or other tissues from blocking your throat. You can also raise the head of your bed. Follow up with your doctor or specialist as directed: You may need to have blood tests during your follow-up visits. Work with your provider to find the right breathing support equipment and settings for you. Write down your questions so you remember to ask them during your visits.   © Copyright [employer] 2022 Information is for End User's use only and may not be sold, redistributed or otherwise used for commercial purposes. The above information is an  only. It is not intended as medical advice for individual conditions or treatments. Talk to your doctor, nurse or pharmacist before following any medical regimen to see if it is safe and effective for you.

## 2023-07-26 NOTE — ASSESSMENT & PLAN NOTE
Severe MJ with AHI 43.7  Underwent a titration study requiring CPAP of 11, prescription provided  I explained to the patient in details the pathophysiology of obstructive sleep apnea. I also explained the importance of treatment, and the consequences of untreated obstructive sleep apnea on underlying cardiovascular and cerebrovascular risk, morbidity, and mortality.

## 2023-07-26 NOTE — PROGRESS NOTES
Sleep Consultation   Renata Corbin 30 y.o. male MRN: [id number]      Reason for consultation: Severe JM    Requesting physician: Dr. Karlos Andrade     Assessment/Plan  1. Severe obstructive sleep apnea  Assessment & Plan:  Severe JM with AHI 43.7  Underwent a titration study requiring CPAP of 11, prescription provided  I explained to the patient in details the pathophysiology of obstructive sleep apnea. I also explained the importance of treatment, and the consequences of untreated obstructive sleep apnea on underlying cardiovascular and cerebrovascular risk, morbidity, and mortality. Orders:  -     CPAP Auto New DME    2. Morbid obesity with BMI of 45.0-49.9, adult (HCC)  Assessment & Plan:  BMI 45.32, noted, the patient is not interested in weight loss surgery now      3. Benign essential HTN  Assessment & Plan:  Treatment of JM might help control blood pressure      4. Excessive daytime sleepiness  Assessment & Plan:  2/2 untreated JM      5. Snoring  Assessment & Plan:  2/2 JM             History of Present Illness   HPI:  Renata Corbin is a 30 y.o. male with PMHx as below who comes in for evaluation of is here for evaluation of recent diagnosed severe obstructive sleep apnea earlier this year. The patient works as a manager in [employer] his job is exhausting he feels tired and sleepy during the day he average 7 to 8 hours of sleep per night and he feels sleepy upon awakening. He has been witnessed to snore loudly, has episodes of apneas, wakens up unrefreshed and he requires taking a nap relatively almost every day if he can. Sitting and reading: High chance of dozing  Watching TV: High chance of dozing  Sitting, inactive in a public place (e.g. a theatre or a meeting): High chance of dozing  As a passenger in a car for an hour without a break:  Moderate chance of dozing  Lying down to rest in the afternoon when circumstances permit: High chance of dozing  Sitting and talking to someone: Would never doze  Sitting quietly after a lunch without alcohol: Slight chance of dozing  In a car, while stopped for a few minutes in traffic: Slight chance of dozing  Total score: 16              Historical Information   Past Medical History:   Diagnosis Date   • Anxiety 8/17/2016   • Benign essential hypertension 7/18/2016   • Eustachian tube disorder    • Fibromatosis, plantar    • Hypertension    • Morbid obesity with BMI of 45.0-49.9, adult ([facility] [address] [facility]) 3/27/2017     Past Surgical History:   Procedure Laterality Date   • APPENDECTOMY     • BONE GRAFT      Bone replacement graft     Family History   Problem Relation Age of Onset   • No Known Problems Mother    • No Known Problems Father      Social History     Socioeconomic History   • Marital status: /Civil Union     Spouse name: Not on file   • Number of children: Not on file   • Years of education: Not on file   • Highest education level: Not on file   Occupational History     Employer: [employer]   Tobacco Use   • Smoking status: Light Smoker     Types: Cigars   • Smokeless tobacco: Never   • Tobacco comments:     4 times a year   Vaping Use   • Vaping Use: Never used   Substance and Sexual Activity   • Alcohol use: Yes     Comment: Social   • Drug use: No   • Sexual activity: Not Currently   Other Topics Concern   • Not on file   Social History Narrative   • Not on file     Social Determinants of Health     Financial Resource Strain: Not on file   Food Insecurity: Not on file   Transportation Needs: Not on file   Physical Activity: Not on file   Stress: Not on file   Social Connections: Not on file   Intimate Partner Violence: Not on file   Housing Stability: Not on file       Occupational History: manager     Meds/Allergies   No Known Allergies    Home medications:  Prior to Admission medications    Medication Sig Start Date End Date Taking?  Authorizing Provider   FLUoxetine (PROzac) 40 MG capsule Take 1 capsule (40 mg total) by mouth daily 5/18/23 11/14/23 Yes HERNESTO Amaral   lisinopril (ZESTRIL) 20 mg tablet Take 1 tablet (20 mg total) by mouth daily 5/18/23 11/14/23 Yes HERNESTO Butterfield   buPROPion (Wellbutrin XL) 150 mg 24 hr tablet Take 1 tablet (150 mg total) by mouth every morning 7/20/22   HERNESTO Amaral       Vitals:   Blood pressure 124/84, pulse 78, height 6' 2" (1.88 m), weight (!) 160 kg (353 lb). ,  Body mass index is 45.32 kg/m². Neck Circumference: 18.5    Physical Exam  General:  Awake alert and oriented x 3, conversant without conversational dyspnea, NAD, normal affect  HEENT:   Sclera noninjected, nonicteric OU, Nares patent,  no craniofacial abnormalities, Mucous membranes, moist, no oral lesions, normal dentition  NECK:  Trachea midline, no accessory muscle use, no stridor,  JVP not elevated  CARDIAC: Reg, single s1/S2, no m/r/g  PULM: CTA bilaterally no wheezing, rhonchi or rales  ABD: Soft nontender, nondistended, no rebound, no rigidity, no guarding  EXT: No cyanosis, no clubbing, no edema, normal capillary refill  NEURO: no focal neurologic deficits, AAOx3, moving all extremities appropriately    Labs: I have personally reviewed pertinent lab results. , ABG: No results found for: "PHART", "OSO3RVS", "PO2ART", "EKP8BAY", "N4MYRNPU", "BEART", "SOURCE", BNP: No results found for: "BNP", CBC: No results found for: "WBC", "HGB", "HCT", "MCV", "PLT", "ADJUSTEDWBC", "RBC", "MCH", "MCHC", "RDW", "MPV", "NRBC", CMP: No results found for: "SODIUM", "K", "CL", "CO2", "ANIONGAP", "BUN", "CREATININE", "GLUCOSE", "CALCIUM", "AST", "ALT", "ALKPHOS", "PROT", "BILITOT", "EGFR", PT/INR: No results found for: "PT", "INR", Troponin: No results found for: "TROPONINI"  Lab Results   Component Value Date    WBC 8.48 05/09/2022    HGB 14.2 05/09/2022    HCT 44.9 05/09/2022    MCV 91 05/09/2022     05/09/2022      Lab Results   Component Value Date    CALCIUM 9.1 05/09/2022    K 4.1 05/09/2022    CO2 30 05/09/2022     05/09/2022    BUN 16 05/09/2022    CREATININE 0.81 05/09/2022     No results found for: "IRON", "TIBC", "FERRITIN"  No results found for: "Vani Slate"  No results found for: "FOLATE"      Sleep studies:  HST: AHI 43  Titration: CPAP 11          Tess Bustillo MD  [facility]       Portions of the record may have been created with voice recognition software. Occasional wrong word or "sound a like" substitutions may have occurred due to the inherent limitations of voice recognition software. Read the chart carefully and recognize, using context, where substitutions have occurred.

## 2023-07-28 LAB
DME PARACHUTE DELIVERY DATE ACTUAL: NORMAL
DME PARACHUTE DELIVERY DATE EXPECTED: NORMAL
DME PARACHUTE DELIVERY DATE REQUESTED: NORMAL
DME PARACHUTE DELIVERY NOTE: NORMAL
DME PARACHUTE ITEM DESCRIPTION: NORMAL
DME PARACHUTE ORDER STATUS: NORMAL
DME PARACHUTE SUPPLIER NAME: NORMAL
DME PARACHUTE SUPPLIER PHONE: NORMAL

## 2023-08-01 ENCOUNTER — TELEPHONE (OUTPATIENT)
Dept: SLEEP CENTER | Facility: CLINIC | Age: 38
End: 2023-08-01

## 2023-08-01 NOTE — TELEPHONE ENCOUNTER
Pt. came into the [address] [place] [address] office to be set up on Auto PAP. Twanjanes Guadalupe gave a ResMed S11 set at 11-15cm. Pt has a mask at home that he will be using, one was not provided. We discussed cleaning, resupply. Bala was not downloaded, all docs signed on OTL device, CC on file.

## 2023-09-11 ENCOUNTER — OFFICE VISIT (OUTPATIENT)
Dept: INTERNAL MEDICINE CLINIC | Facility: OTHER | Age: 38
End: 2023-09-11

## 2023-09-11 VITALS
HEIGHT: 74 IN | DIASTOLIC BLOOD PRESSURE: 84 MMHG | BODY MASS INDEX: 40.43 KG/M2 | WEIGHT: 315 LBS | OXYGEN SATURATION: 98 % | HEART RATE: 83 BPM | RESPIRATION RATE: 20 BRPM | TEMPERATURE: 99.1 F | SYSTOLIC BLOOD PRESSURE: 134 MMHG

## 2023-09-11 DIAGNOSIS — I10 ESSENTIAL HYPERTENSION, BENIGN: ICD-10-CM

## 2023-09-11 DIAGNOSIS — F34.1 DYSTHYMIA: ICD-10-CM

## 2023-09-11 DIAGNOSIS — F41.9 ANXIETY: ICD-10-CM

## 2023-09-11 DIAGNOSIS — I10 BENIGN ESSENTIAL HTN: ICD-10-CM

## 2023-09-11 DIAGNOSIS — F42.9 OBSESSIVE-COMPULSIVE DISORDER, UNSPECIFIED TYPE: ICD-10-CM

## 2023-09-11 DIAGNOSIS — G57.12 MERALGIA PARESTHETICA OF LEFT SIDE: Primary | ICD-10-CM

## 2023-09-11 DIAGNOSIS — E66.01 MORBID OBESITY WITH BMI OF 45.0-49.9, ADULT (HCC): ICD-10-CM

## 2023-09-11 PROBLEM — R55 VASOVAGAL REACTION: Status: RESOLVED | Noted: 2022-10-24 | Resolved: 2023-09-11

## 2023-09-11 PROCEDURE — 99213 OFFICE O/P EST LOW 20 MIN: CPT | Performed by: INTERNAL MEDICINE

## 2023-09-11 RX ORDER — LISINOPRIL 20 MG/1
20 TABLET ORAL DAILY
Qty: 90 TABLET | Refills: 1 | Status: SHIPPED | OUTPATIENT
Start: 2023-09-11 | End: 2024-03-09

## 2023-09-11 RX ORDER — FLUOXETINE HYDROCHLORIDE 40 MG/1
40 CAPSULE ORAL DAILY
Qty: 90 CAPSULE | Refills: 1 | Status: SHIPPED | OUTPATIENT
Start: 2023-09-11 | End: 2024-03-09

## 2023-09-11 NOTE — PROGRESS NOTES
Assessment/Plan:    Meralgia paresthetica of left side  Discussed weight loss and lifestyle modifications to help alleviate his symptoms such as diet and exercise. Will prescribe Voltaren gel PRN. Benign essential HTN  Stable, controlled. Continue current antihypertensive regimen: Lisinopril 20 mg daily. Anxiety  Controlled. Continue fluoxetine 40 mg daily. Morbid obesity with BMI of 45.0-49.9, adult ([facility])  Discussed diet, exercise, and weight loss         Diagnoses and all orders for this visit:    Meralgia paresthetica of left side  -     Diclofenac Sodium (VOLTAREN) 1 %; Apply 2 g topically 4 (four) times a day as needed (numbing pain, joint pain.)    Essential hypertension, benign  -     lisinopril (ZESTRIL) 20 mg tablet; Take 1 tablet (20 mg total) by mouth daily  -     CBC; Future  -     Comprehensive metabolic panel; Future  -     Lipid panel; Future    Anxiety  -     FLUoxetine (PROzac) 40 MG capsule; Take 1 capsule (40 mg total) by mouth daily    Benign essential HTN    Dysthymia    Morbid obesity with BMI of 45.0-49.9, adult ([facility])    Obsessive-compulsive disorder, unspecified type          BMI Counseling: Body mass index is 45.76 kg/m². The BMI is above normal. Nutrition recommendations include decreasing portion sizes, encouraging healthy choices of fruits and vegetables, decreasing fast food intake, consuming healthier snacks, limiting drinks that contain sugar, moderation in carbohydrate intake, increasing intake of lean protein, reducing intake of saturated and trans fat and reducing intake of cholesterol. Exercise recommendations include moderate physical activity 150 minutes/week and exercising 3-5 times per week. No pharmacotherapy was ordered. Patient referred to PCP. Rationale for BMI follow-up plan is due to patient being overweight or obese. Tobacco Cessation Counseling: Tobacco cessation counseling was provided.  The patient is sincerely urged to quit consumption of tobacco. He is not ready to quit tobacco. Medication options and side effects of medication discussed. Patient refused medication. Subjective:      Patient ID: Mikie Dyer is a 30 y.o. male. Chief Complaint   Patient presents with   • thigh pain     Left. For past few months but has a spider vein on upper thigh and gets a sharp stinging pain when he's standing for a long period of time. Also complaining of numbness in the upper thigh area   • Medication Refill     Was wondering if her can have his medications filled, does have about 1.5 months left so he doesn't have to come back for a follow up   •      Phq, annual, bmi follow up plan       30year old male is seen today for follow-up of chronic conditions as well as concern for paresthesia of the left mid thigh region. He first noticed the symptoms 2 months ago. Symptoms are exacerbated with prolonged periods of standing, primarily when at work (). He denies any recent injury. He admits to mild intermittent back pain, most recently 1 week ago which was mild. Recent laboratory studies reviewed today. He has been compliant with his medication regimen. He has no other complaints or concerns at this time. Medication Refill  Pertinent negatives include no abdominal pain, chest pain, chills, congestion, coughing, diaphoresis, fatigue, fever, headaches, nausea, numbness, sore throat, vomiting or weakness. Anxiety  Presents for follow-up visit. Patient reports no chest pain, dizziness, nausea, palpitations, shortness of breath or suicidal ideas. The severity of symptoms is mild. The quality of sleep is good. Nighttime awakenings: none. Compliance with medications is %. Hypertension  This is a chronic problem. The current episode started more than 1 year ago. The problem is unchanged. The problem is controlled. Associated symptoms include anxiety.  Pertinent negatives include no chest pain, headaches, palpitations or shortness of breath. Past treatments include ACE inhibitors. The current treatment provides moderate improvement. Compliance problems include exercise and diet. The following portions of the patient's history were reviewed and updated as appropriate: allergies, current medications, past family history, past medical history, past social history, past surgical history and problem list.    Review of Systems   Constitutional: Negative for activity change, appetite change, chills, diaphoresis, fatigue and fever. HENT: Negative for congestion, postnasal drip, rhinorrhea, sinus pressure, sinus pain, sneezing and sore throat. Eyes: Negative for visual disturbance. Respiratory: Negative for apnea, cough, choking, chest tightness, shortness of breath and wheezing. Cardiovascular: Negative for chest pain, palpitations and leg swelling. Gastrointestinal: Negative for abdominal distention, abdominal pain, anal bleeding, blood in stool, constipation, diarrhea, nausea and vomiting. Endocrine: Negative for cold intolerance and heat intolerance. Genitourinary: Negative for difficulty urinating, dysuria and hematuria. Musculoskeletal: Negative. Skin: Negative. Neurological: Negative for dizziness, weakness, light-headedness, numbness and headaches. Hematological: Negative for adenopathy. Psychiatric/Behavioral: Negative for agitation, sleep disturbance and suicidal ideas. All other systems reviewed and are negative.         Past Medical History:   Diagnosis Date   • Anxiety 8/17/2016   • Benign essential hypertension 7/18/2016   • Eustachian tube disorder    • Fibromatosis, plantar    • Hypertension    • Morbid obesity with BMI of 45.0-49.9, adult ([address] [facility]) 3/27/2017         Current Outpatient Medications:   •  Diclofenac Sodium (VOLTAREN) 1 %, Apply 2 g topically 4 (four) times a day as needed (numbing pain, joint pain.), Disp: 100 g, Rfl: 0  •  FLUoxetine (PROzac) 40 MG capsule, Take 1 capsule (40 mg total) by mouth daily, Disp: 90 capsule, Rfl: 1  •  lisinopril (ZESTRIL) 20 mg tablet, Take 1 tablet (20 mg total) by mouth daily, Disp: 90 tablet, Rfl: 1    No Known Allergies    Social History   Past Surgical History:   Procedure Laterality Date   • APPENDECTOMY     • BONE GRAFT      Bone replacement graft     Family History   Problem Relation Age of Onset   • No Known Problems Mother    • No Known Problems Father        Objective:  /84 (BP Location: Left arm, Patient Position: Sitting, Cuff Size: Large)   Pulse 83   Temp 99.1 °F (37.3 °C) (Temporal)   Resp 20   Ht 6' 2" (1.88 m)   Wt (!) 162 kg (356 lb 6.4 oz)   SpO2 98%   BMI 45.76 kg/m²     Recent Results (from the past 1344 hour(s))   CPAP Package    Collection Time: 07/26/23  4:13 PM   Result Value Ref Range    Supplier Name [email or web address] [place] [employer]     Supplier Phone Number [phone]     Order Status Delivery Successful     Delivery Note [place]- PATIENT IS SELF PAY     Delivery Request Date 07/28/2023     Date Delivered  07/28/2023     Supplier Name 07/28/2023     Item Description CPAP Machine, Resmed S10 Auto-CPAP     Item Description       PAP Mask, Nasal, Fit Upon Setup, N/A, 1 per 3 months    Item Description PAP Mask Interface Cushion, Nasal, 2 per 1 month     Item Description PAP Headgear, 1 per 6 months     Item Description PAP Humidifier, Heated     Item Description Disposable PAP Filter, 2 per 1 month     Item Description Non-Disposable PAP Filter, 1 per 6 months     Item Description PAP Machine Tubing, Heated, 1 per 3 months     Item Description PAP Monitoring Modem     Item Description Humidifier Water Chamber, 1 per 6 months             Physical Exam  Vitals and nursing note reviewed. Constitutional:       General: He is not in acute distress. Appearance: He is well-developed. He is not diaphoretic. HENT:      Head: Normocephalic and atraumatic. Eyes:      General: No scleral icterus.         Right eye: No discharge. Left eye: No discharge. Conjunctiva/sclera: Conjunctivae normal.      Pupils: Pupils are equal, round, and reactive to light. Neck:      Thyroid: No thyromegaly. Vascular: No JVD. Cardiovascular:      Rate and Rhythm: Normal rate and regular rhythm. Heart sounds: Normal heart sounds. No murmur heard. No friction rub. No gallop. Pulmonary:      Effort: Pulmonary effort is normal. No respiratory distress. Breath sounds: Normal breath sounds. No wheezing or rales. Chest:      Chest wall: No tenderness. Abdominal:      General: Bowel sounds are normal. There is no distension. Palpations: Abdomen is soft. There is no mass. Tenderness: There is no abdominal tenderness. There is no guarding or rebound. Musculoskeletal:         General: No tenderness or deformity. Normal range of motion. Cervical back: Normal range of motion and neck supple. Lymphadenopathy:      Cervical: No cervical adenopathy. Skin:     General: Skin is warm and dry. Coloration: Skin is not pale. Findings: No erythema or rash. Neurological:      Mental Status: He is alert and oriented to person, place, and time. Cranial Nerves: No cranial nerve deficit. Coordination: Coordination normal.      Deep Tendon Reflexes: Reflexes are normal and symmetric. Psychiatric:         Behavior: Behavior normal.         Thought Content:  Thought content normal.         Judgment: Judgment normal.

## 2023-09-11 NOTE — ASSESSMENT & PLAN NOTE
Discussed weight loss and lifestyle modifications to help alleviate his symptoms such as diet and exercise. Will prescribe Voltaren gel PRN.

## 2023-12-27 ENCOUNTER — TELEPHONE (OUTPATIENT)
Dept: SLEEP CENTER | Facility: CLINIC | Age: 38
End: 2023-12-27

## 2024-02-12 ENCOUNTER — TELEPHONE (OUTPATIENT)
Dept: INTERNAL MEDICINE CLINIC | Age: 39
End: 2024-02-12

## 2024-02-12 NOTE — TELEPHONE ENCOUNTER
Patient called and would like to know what he can take OTC for cough with mucous, sore throat, sinus congestion.  Started about 3 days ago.  Patient tested for COVID on Saturday, negative.   Patient took Ibuprofen so far.      Patient refused to schedule in office or virtual appointment for symptoms.

## 2024-02-12 NOTE — TELEPHONE ENCOUNTER
Patient can take Flonase twice daily, second-generation antihistamine such as Zyrtec or Allegra daily, Mucinex DM.  Also advise salt water gargles for sore throat, saline nasal sprays, warm liquids, steam showers.  If symptoms not improving, or if the patient experiences any shortness of breath, recommend evaluation

## 2024-03-17 ENCOUNTER — TELEPHONE (OUTPATIENT)
Dept: OTHER | Facility: OTHER | Age: 39
End: 2024-03-17

## 2024-03-17 DIAGNOSIS — F41.9 ANXIETY: ICD-10-CM

## 2024-03-17 DIAGNOSIS — I10 ESSENTIAL HYPERTENSION, BENIGN: ICD-10-CM

## 2024-03-18 DIAGNOSIS — F41.9 ANXIETY: ICD-10-CM

## 2024-03-18 DIAGNOSIS — I10 ESSENTIAL HYPERTENSION, BENIGN: ICD-10-CM

## 2024-03-18 RX ORDER — FLUOXETINE HYDROCHLORIDE 40 MG/1
40 CAPSULE ORAL DAILY
Qty: 90 CAPSULE | Refills: 0 | OUTPATIENT
Start: 2024-03-18 | End: 2024-09-14

## 2024-03-18 RX ORDER — LISINOPRIL 20 MG/1
20 TABLET ORAL DAILY
Qty: 60 TABLET | Refills: 0 | Status: SHIPPED | OUTPATIENT
Start: 2024-03-18 | End: 2024-09-14

## 2024-03-18 RX ORDER — LISINOPRIL 20 MG/1
20 TABLET ORAL DAILY
Qty: 90 TABLET | Refills: 0 | OUTPATIENT
Start: 2024-03-18 | End: 2024-09-14

## 2024-03-18 RX ORDER — FLUOXETINE HYDROCHLORIDE 40 MG/1
40 CAPSULE ORAL DAILY
Qty: 60 CAPSULE | Refills: 0 | Status: SHIPPED | OUTPATIENT
Start: 2024-03-18 | End: 2024-09-14

## 2024-03-18 NOTE — TELEPHONE ENCOUNTER
Next Office Visit 5/13/24 canceled 6 month appointment for 3/18/24 and rescheduled only filling enough to next appointment

## 2024-05-18 DIAGNOSIS — I10 ESSENTIAL HYPERTENSION, BENIGN: ICD-10-CM

## 2024-05-19 RX ORDER — LISINOPRIL 20 MG/1
20 TABLET ORAL DAILY
Qty: 90 TABLET | Refills: 1 | Status: SHIPPED | OUTPATIENT
Start: 2024-05-19

## 2024-07-11 DIAGNOSIS — I10 ESSENTIAL HYPERTENSION, BENIGN: ICD-10-CM

## 2024-07-11 DIAGNOSIS — F41.9 ANXIETY: ICD-10-CM

## 2024-07-11 RX ORDER — FLUOXETINE HYDROCHLORIDE 40 MG/1
40 CAPSULE ORAL DAILY
Qty: 60 CAPSULE | Refills: 1 | Status: SHIPPED | OUTPATIENT
Start: 2024-07-11 | End: 2025-01-07

## 2024-07-11 RX ORDER — LISINOPRIL 20 MG/1
20 TABLET ORAL DAILY
Qty: 30 TABLET | Refills: 0 | Status: SHIPPED | OUTPATIENT
Start: 2024-07-11

## 2024-07-11 NOTE — TELEPHONE ENCOUNTER
Patient requires updated blood work and has previously placed orders. Please contact patient to go for labs. Courtesy refill provided.  CMP    A [employer] message was sent to the patient letting them know a courtesy refill has been provided and to complete lab(s) prior to next refill. Please follow up to make sure pt receives/reads message and completes blood work.

## 2024-11-29 DIAGNOSIS — I10 ESSENTIAL HYPERTENSION, BENIGN: ICD-10-CM

## 2024-11-29 DIAGNOSIS — F41.9 ANXIETY: ICD-10-CM

## 2024-11-29 RX ORDER — FLUOXETINE 40 MG/1
40 CAPSULE ORAL DAILY
Qty: 60 CAPSULE | Refills: 0 | Status: SHIPPED | OUTPATIENT
Start: 2024-11-29 | End: 2025-05-28

## 2024-11-29 RX ORDER — LISINOPRIL 20 MG/1
20 TABLET ORAL DAILY
Qty: 90 TABLET | Refills: 1 | Status: SHIPPED | OUTPATIENT
Start: 2024-11-29

## 2024-11-29 RX ORDER — LISINOPRIL 20 MG/1
20 TABLET ORAL DAILY
Qty: 30 TABLET | Refills: 0 | Status: SHIPPED | OUTPATIENT
Start: 2024-11-29 | End: 2024-11-29

## 2025-03-07 ENCOUNTER — TELEPHONE (OUTPATIENT)
Dept: INTERNAL MEDICINE CLINIC | Facility: OTHER | Age: 40
End: 2025-03-07

## 2025-03-07 NOTE — TELEPHONE ENCOUNTER
Patient being seen by [facility], Dr. Durga Brooks.  Last seen by them on 2/3/25.  Please remove Lisandra as PCP.